# Patient Record
Sex: FEMALE | Race: WHITE | URBAN - METROPOLITAN AREA
[De-identification: names, ages, dates, MRNs, and addresses within clinical notes are randomized per-mention and may not be internally consistent; named-entity substitution may affect disease eponyms.]

---

## 2021-02-01 ENCOUNTER — EKG ENCOUNTER (OUTPATIENT)
Dept: LAB | Age: 31
End: 2021-02-01
Attending: STUDENT IN AN ORGANIZED HEALTH CARE EDUCATION/TRAINING PROGRAM
Payer: COMMERCIAL

## 2021-02-01 ENCOUNTER — OFFICE VISIT (OUTPATIENT)
Dept: FAMILY MEDICINE CLINIC | Facility: CLINIC | Age: 31
End: 2021-02-01
Payer: COMMERCIAL

## 2021-02-01 VITALS
SYSTOLIC BLOOD PRESSURE: 148 MMHG | WEIGHT: 293 LBS | HEIGHT: 68 IN | TEMPERATURE: 98 F | HEART RATE: 111 BPM | BODY MASS INDEX: 44.41 KG/M2 | DIASTOLIC BLOOD PRESSURE: 78 MMHG

## 2021-02-01 DIAGNOSIS — M21.41 FLAT FEET, BILATERAL: ICD-10-CM

## 2021-02-01 DIAGNOSIS — Z00.00 WELL ADULT EXAM: ICD-10-CM

## 2021-02-01 DIAGNOSIS — E66.01 OBESITY, MORBID, BMI 50 OR HIGHER (HCC): ICD-10-CM

## 2021-02-01 DIAGNOSIS — Z00.00 WELL ADULT EXAM: Primary | ICD-10-CM

## 2021-02-01 DIAGNOSIS — M21.42 FLAT FEET, BILATERAL: ICD-10-CM

## 2021-02-01 DIAGNOSIS — L81.9 HYPERPIGMENTATION OF SKIN: ICD-10-CM

## 2021-02-01 DIAGNOSIS — R07.9 LEFT-SIDED CHEST PAIN: Primary | ICD-10-CM

## 2021-02-01 DIAGNOSIS — R07.9 LEFT-SIDED CHEST PAIN: ICD-10-CM

## 2021-02-01 PROBLEM — U07.1 COVID-19: Status: ACTIVE | Noted: 2020-11-25

## 2021-02-01 LAB
ALBUMIN SERPL-MCNC: 3.8 G/DL (ref 3.4–5)
ALBUMIN/GLOB SERPL: 1.1 {RATIO} (ref 1–2)
ALP LIVER SERPL-CCNC: 66 U/L
ALT SERPL-CCNC: 32 U/L
ANION GAP SERPL CALC-SCNC: 7 MMOL/L (ref 0–18)
AST SERPL-CCNC: 12 U/L (ref 15–37)
BASOPHILS # BLD AUTO: 0.03 X10(3) UL (ref 0–0.2)
BASOPHILS NFR BLD AUTO: 0.5 %
BILIRUB SERPL-MCNC: 0.5 MG/DL (ref 0.1–2)
BUN BLD-MCNC: 12 MG/DL (ref 7–18)
BUN/CREAT SERPL: 18.2 (ref 10–20)
CALCIUM BLD-MCNC: 9.2 MG/DL (ref 8.5–10.1)
CHLORIDE SERPL-SCNC: 109 MMOL/L (ref 98–112)
CHOLEST SMN-MCNC: 122 MG/DL (ref ?–200)
CO2 SERPL-SCNC: 25 MMOL/L (ref 21–32)
CREAT BLD-MCNC: 0.66 MG/DL
DEPRECATED RDW RBC AUTO: 43.8 FL (ref 35.1–46.3)
EOSINOPHIL # BLD AUTO: 0.14 X10(3) UL (ref 0–0.7)
EOSINOPHIL NFR BLD AUTO: 2.1 %
ERYTHROCYTE [DISTWIDTH] IN BLOOD BY AUTOMATED COUNT: 13.2 % (ref 11–15)
EST. AVERAGE GLUCOSE BLD GHB EST-MCNC: 111 MG/DL (ref 68–126)
GLOBULIN PLAS-MCNC: 3.6 G/DL (ref 2.8–4.4)
GLUCOSE BLD-MCNC: 99 MG/DL (ref 70–99)
HBA1C MFR BLD HPLC: 5.5 % (ref ?–5.7)
HCT VFR BLD AUTO: 44.4 %
HDLC SERPL-MCNC: 46 MG/DL (ref 40–59)
HGB BLD-MCNC: 14.5 G/DL
IMM GRANULOCYTES # BLD AUTO: 0.01 X10(3) UL (ref 0–1)
IMM GRANULOCYTES NFR BLD: 0.2 %
LDLC SERPL CALC-MCNC: 53 MG/DL (ref ?–100)
LYMPHOCYTES # BLD AUTO: 2.25 X10(3) UL (ref 1–4)
LYMPHOCYTES NFR BLD AUTO: 34.5 %
M PROTEIN MFR SERPL ELPH: 7.4 G/DL (ref 6.4–8.2)
MCH RBC QN AUTO: 29.5 PG (ref 26–34)
MCHC RBC AUTO-ENTMCNC: 32.7 G/DL (ref 31–37)
MCV RBC AUTO: 90.2 FL
MONOCYTES # BLD AUTO: 0.42 X10(3) UL (ref 0.1–1)
MONOCYTES NFR BLD AUTO: 6.4 %
NEUTROPHILS # BLD AUTO: 3.67 X10 (3) UL (ref 1.5–7.7)
NEUTROPHILS # BLD AUTO: 3.67 X10(3) UL (ref 1.5–7.7)
NEUTROPHILS NFR BLD AUTO: 56.3 %
NONHDLC SERPL-MCNC: 76 MG/DL (ref ?–130)
OSMOLALITY SERPL CALC.SUM OF ELEC: 292 MOSM/KG (ref 275–295)
PATIENT FASTING Y/N/NP: NO
PATIENT FASTING Y/N/NP: NO
PLATELET # BLD AUTO: 329 10(3)UL (ref 150–450)
POTASSIUM SERPL-SCNC: 3.9 MMOL/L (ref 3.5–5.1)
RBC # BLD AUTO: 4.92 X10(6)UL
SODIUM SERPL-SCNC: 141 MMOL/L (ref 136–145)
TRIGL SERPL-MCNC: 115 MG/DL (ref 30–149)
TSI SER-ACNC: 0.91 MIU/ML (ref 0.36–3.74)
VLDLC SERPL CALC-MCNC: 23 MG/DL (ref 0–30)
WBC # BLD AUTO: 6.5 X10(3) UL (ref 4–11)

## 2021-02-01 PROCEDURE — 3078F DIAST BP <80 MM HG: CPT | Performed by: STUDENT IN AN ORGANIZED HEALTH CARE EDUCATION/TRAINING PROGRAM

## 2021-02-01 PROCEDURE — 84443 ASSAY THYROID STIM HORMONE: CPT

## 2021-02-01 PROCEDURE — 85025 COMPLETE CBC W/AUTO DIFF WBC: CPT

## 2021-02-01 PROCEDURE — 80053 COMPREHEN METABOLIC PANEL: CPT

## 2021-02-01 PROCEDURE — 3077F SYST BP >= 140 MM HG: CPT | Performed by: STUDENT IN AN ORGANIZED HEALTH CARE EDUCATION/TRAINING PROGRAM

## 2021-02-01 PROCEDURE — 3008F BODY MASS INDEX DOCD: CPT | Performed by: STUDENT IN AN ORGANIZED HEALTH CARE EDUCATION/TRAINING PROGRAM

## 2021-02-01 PROCEDURE — 82306 VITAMIN D 25 HYDROXY: CPT

## 2021-02-01 PROCEDURE — 36415 COLL VENOUS BLD VENIPUNCTURE: CPT

## 2021-02-01 PROCEDURE — 99385 PREV VISIT NEW AGE 18-39: CPT | Performed by: STUDENT IN AN ORGANIZED HEALTH CARE EDUCATION/TRAINING PROGRAM

## 2021-02-01 PROCEDURE — 80061 LIPID PANEL: CPT

## 2021-02-01 PROCEDURE — 93010 ELECTROCARDIOGRAM REPORT: CPT | Performed by: STUDENT IN AN ORGANIZED HEALTH CARE EDUCATION/TRAINING PROGRAM

## 2021-02-01 PROCEDURE — 83036 HEMOGLOBIN GLYCOSYLATED A1C: CPT

## 2021-02-01 PROCEDURE — 93005 ELECTROCARDIOGRAM TRACING: CPT

## 2021-02-01 NOTE — PROGRESS NOTES
HPI:    Patient ID: Robin Yo is a 31year old female. HPI  Pt presenting for routine physical exam. Denies any acute issues or recent illnesses. No significant chronic medical problems.  Past medical/surgical history, family history, and social hi Allergies    02/01/21  1046   BP: 148/78   Pulse: 111   Temp: 98 °F (36.7 °C)   TempSrc: Temporal   Weight: (!) 343 lb (155.6 kg)   Height: 5' 8\" (1.727 m)       Body mass index is 52.15 kg/m². PHYSICAL EXAM:   Physical Exam  Vitals signs reviewed.    Co person, place, and time. Mental status is at baseline. Psychiatric:         Attention and Perception: Attention normal.         Mood and Affect: Mood normal. Affect is tearful. Behavior: Behavior normal. Behavior is cooperative.          Thought C

## 2021-02-03 LAB — 25(OH)D3 SERPL-MCNC: 16.7 NG/ML (ref 30–100)

## 2021-02-04 PROBLEM — M21.41 FLAT FEET, BILATERAL: Status: ACTIVE | Noted: 2021-02-04

## 2021-02-04 PROBLEM — M21.42 FLAT FEET, BILATERAL: Status: ACTIVE | Noted: 2021-02-04

## 2021-02-04 PROBLEM — E66.01 OBESITY, MORBID, BMI 50 OR HIGHER (HCC): Status: ACTIVE | Noted: 2021-02-04

## 2021-04-23 DIAGNOSIS — E55.9 VITAMIN D DEFICIENCY: ICD-10-CM

## 2021-04-23 RX ORDER — ERGOCALCIFEROL 1.25 MG/1
CAPSULE ORAL
Qty: 12 CAPSULE | Refills: 1 | Status: SHIPPED | OUTPATIENT
Start: 2021-04-23

## 2021-08-02 ENCOUNTER — LAB ENCOUNTER (OUTPATIENT)
Dept: LAB | Age: 31
End: 2021-08-02
Attending: STUDENT IN AN ORGANIZED HEALTH CARE EDUCATION/TRAINING PROGRAM
Payer: COMMERCIAL

## 2021-08-02 ENCOUNTER — OFFICE VISIT (OUTPATIENT)
Dept: FAMILY MEDICINE CLINIC | Facility: CLINIC | Age: 31
End: 2021-08-02
Payer: COMMERCIAL

## 2021-08-02 VITALS
WEIGHT: 293 LBS | DIASTOLIC BLOOD PRESSURE: 79 MMHG | RESPIRATION RATE: 18 BRPM | HEART RATE: 68 BPM | HEIGHT: 68 IN | BODY MASS INDEX: 44.41 KG/M2 | SYSTOLIC BLOOD PRESSURE: 127 MMHG

## 2021-08-02 DIAGNOSIS — L91.8 MULTIPLE ACQUIRED SKIN TAGS: ICD-10-CM

## 2021-08-02 DIAGNOSIS — E55.9 VITAMIN D DEFICIENCY: ICD-10-CM

## 2021-08-02 DIAGNOSIS — L30.1 DYSHIDROTIC ECZEMA: Primary | ICD-10-CM

## 2021-08-02 PROCEDURE — 36415 COLL VENOUS BLD VENIPUNCTURE: CPT

## 2021-08-02 PROCEDURE — 82306 VITAMIN D 25 HYDROXY: CPT

## 2021-08-02 PROCEDURE — 3074F SYST BP LT 130 MM HG: CPT | Performed by: STUDENT IN AN ORGANIZED HEALTH CARE EDUCATION/TRAINING PROGRAM

## 2021-08-02 PROCEDURE — 3078F DIAST BP <80 MM HG: CPT | Performed by: STUDENT IN AN ORGANIZED HEALTH CARE EDUCATION/TRAINING PROGRAM

## 2021-08-02 PROCEDURE — 99213 OFFICE O/P EST LOW 20 MIN: CPT | Performed by: STUDENT IN AN ORGANIZED HEALTH CARE EDUCATION/TRAINING PROGRAM

## 2021-08-02 PROCEDURE — 3008F BODY MASS INDEX DOCD: CPT | Performed by: STUDENT IN AN ORGANIZED HEALTH CARE EDUCATION/TRAINING PROGRAM

## 2021-08-02 RX ORDER — TRIAMCINOLONE ACETONIDE 5 MG/G
1 CREAM TOPICAL 2 TIMES DAILY PRN
Qty: 15 G | Refills: 1 | Status: SHIPPED | OUTPATIENT
Start: 2021-08-02

## 2021-08-02 NOTE — PROGRESS NOTES
HPI:    Patient ID: Latrice Cho is a 36year old female. HPI  Pt presenting with skin lesions. H/o dry skin in the winter. She reports new blisters between fingers this summer that become dry, itchy, and painful.  She works with her hands as a hairst ASSESSMENT/PLAN:   1.  Dyshidrotic eczema  - encouraged daily emollient application  - apply topical steroid to affected areas  - increase hydration and rest as tolerated  - avoid skin irritants as able  - triamcinolone acetonide 0.5 % External Cream

## 2021-08-03 LAB — VIT D+METAB SERPL-MCNC: 23.4 NG/ML (ref 30–100)

## 2021-08-30 ENCOUNTER — OFFICE VISIT (OUTPATIENT)
Dept: FAMILY MEDICINE CLINIC | Facility: CLINIC | Age: 31
End: 2021-08-30
Payer: COMMERCIAL

## 2021-08-30 VITALS
HEART RATE: 76 BPM | SYSTOLIC BLOOD PRESSURE: 116 MMHG | HEIGHT: 68 IN | BODY MASS INDEX: 44.41 KG/M2 | WEIGHT: 293 LBS | DIASTOLIC BLOOD PRESSURE: 75 MMHG | TEMPERATURE: 98 F

## 2021-08-30 DIAGNOSIS — L91.8 INFLAMED SKIN TAG: ICD-10-CM

## 2021-08-30 DIAGNOSIS — L30.1 DYSHIDROTIC ECZEMA: Primary | ICD-10-CM

## 2021-08-30 PROCEDURE — 3078F DIAST BP <80 MM HG: CPT | Performed by: STUDENT IN AN ORGANIZED HEALTH CARE EDUCATION/TRAINING PROGRAM

## 2021-08-30 PROCEDURE — 99213 OFFICE O/P EST LOW 20 MIN: CPT | Performed by: STUDENT IN AN ORGANIZED HEALTH CARE EDUCATION/TRAINING PROGRAM

## 2021-08-30 PROCEDURE — 3074F SYST BP LT 130 MM HG: CPT | Performed by: STUDENT IN AN ORGANIZED HEALTH CARE EDUCATION/TRAINING PROGRAM

## 2021-08-30 PROCEDURE — 3008F BODY MASS INDEX DOCD: CPT | Performed by: STUDENT IN AN ORGANIZED HEALTH CARE EDUCATION/TRAINING PROGRAM

## 2021-08-30 PROCEDURE — 11200 RMVL SKIN TAGS UP TO&INC 15: CPT | Performed by: STUDENT IN AN ORGANIZED HEALTH CARE EDUCATION/TRAINING PROGRAM

## 2021-08-30 RX ORDER — FLUOCINONIDE 0.5 MG/G
1 OINTMENT TOPICAL 2 TIMES DAILY
Qty: 30 G | Refills: 0 | Status: SHIPPED | OUTPATIENT
Start: 2021-08-30

## 2021-08-30 NOTE — PROGRESS NOTES
HPI:    Patient ID: Vasu Goldman is a 36year old female. HPI  Pt presenting for follow-up.  She started triamcinolone cream for hand eczema, notes initially improvement however hands became extremely dry and became cracked, reports pain with finger f ASSESSMENT/PLAN:   1. Dyshidrotic eczema  - emphasized regular emollient application  - will trial fluocinonide ointment at bedtime  - follow-up with Derm for persistent lesions  - Fluocinonide 0.05 % External Ointment;  Apply 1 Application topically 2 (t

## 2021-10-25 ENCOUNTER — OFFICE VISIT (OUTPATIENT)
Dept: DERMATOLOGY CLINIC | Facility: CLINIC | Age: 31
End: 2021-10-25
Payer: COMMERCIAL

## 2021-10-25 DIAGNOSIS — L30.9 DERMATITIS: Primary | ICD-10-CM

## 2021-10-25 PROCEDURE — 99203 OFFICE O/P NEW LOW 30 MIN: CPT | Performed by: DERMATOLOGY

## 2021-10-25 RX ORDER — PREDNISONE 10 MG/1
TABLET ORAL
Qty: 120 TABLET | Refills: 0 | Status: SHIPPED | OUTPATIENT
Start: 2021-10-25

## 2021-10-25 RX ORDER — CEPHALEXIN 500 MG/1
500 CAPSULE ORAL 2 TIMES DAILY
Qty: 20 CAPSULE | Refills: 0 | Status: SHIPPED | OUTPATIENT
Start: 2021-10-25

## 2021-10-25 RX ORDER — FLUCONAZOLE 100 MG/1
100 TABLET ORAL DAILY
Qty: 7 TABLET | Refills: 1 | Status: SHIPPED | OUTPATIENT
Start: 2021-10-25

## 2021-11-02 ENCOUNTER — TELEPHONE (OUTPATIENT)
Dept: DERMATOLOGY CLINIC | Facility: CLINIC | Age: 31
End: 2021-11-02

## 2021-11-02 NOTE — TELEPHONE ENCOUNTER
She should not need additional fluconazole. May decrease prednisone to 20 in am and 10 in pm ( 30mg every day)  x 5 d then decrease to 20mg every day x 5 d, then 10mg every day x 5  finish cephalexin. Continue moisturizers. Peeling to be expected.  I would

## 2021-11-02 NOTE — TELEPHONE ENCOUNTER
Patient calling provider told her to call in 5-7 days after taking medication. Patient would like to know the next steps. Please advise.

## 2021-11-02 NOTE — TELEPHONE ENCOUNTER
S/w pt. LOV 10/25/2, pt calling with an update. She continues on prednisone 20mg BID, keflex 500mg BID, has 1 pill left of fluconazole for today (should she refill?) using cerave cream during the day to hands and aquaphor at bedtime.  States hands have meagan

## 2021-11-07 NOTE — PROGRESS NOTES
Reynaldo Palomares is a 36year old female. Patient presents with:  Derm Problem: New pt. pt presenting today with rash to bilateral hands and fingers since July. c/o dryness and blisters to bilateral fingers. pt states also itching.  Denies changes in sop (two) times daily as needed (itching).  (Patient not taking: Reported on 10/25/2021) 15 g 1   • ERGOCALCIFEROL 1.25 MG (21656 UT) Oral Cap TAKE 1 CAPSULE BY MOUTH ONE TIME PER WEEK (Patient not taking: Reported on 10/25/2021) 12 capsule 1     Allergies:   N Faith Services: Not on file      Active Member of Clubs or Organizations: Not on file      Attends Club or Organization Meetings: Not on file      Marital Status: Not on file  Intimate Partner Violence:       Fear of Current or Ex-Partner: Not on file with concerns above. Denies any other systemic complaints. No fevers, chills, night sweats, photosensitivity, lymph node swelling. No other skin complaints. History, medications, allergies as noted. Nothing new or different no unusual exposures. fissuring cephalexin, oral fluconazole for more fissuring and scaling at the webspaces  Dermatitis. Meds in grid. Skin care instructions reviewed.   The use various products including moisturizers, creams soaps cleansers detergent etc. reviewed with victoria

## 2021-11-15 ENCOUNTER — TELEPHONE (OUTPATIENT)
Dept: DERMATOLOGY CLINIC | Facility: CLINIC | Age: 31
End: 2021-11-15

## 2021-11-15 ENCOUNTER — OFFICE VISIT (OUTPATIENT)
Dept: DERMATOLOGY CLINIC | Facility: CLINIC | Age: 31
End: 2021-11-15
Payer: COMMERCIAL

## 2021-11-15 DIAGNOSIS — L30.9 DERMATITIS: ICD-10-CM

## 2021-11-15 DIAGNOSIS — L20.89 OTHER ATOPIC DERMATITIS: Primary | ICD-10-CM

## 2021-11-15 PROCEDURE — 99213 OFFICE O/P EST LOW 20 MIN: CPT | Performed by: DERMATOLOGY

## 2021-11-15 RX ORDER — DUPILUMAB 300 MG/2ML
600 INJECTION, SOLUTION SUBCUTANEOUS ONCE
Qty: 4 ML | Refills: 0 | Status: SHIPPED | OUTPATIENT
Start: 2021-11-16 | End: 2021-11-16

## 2021-11-15 RX ORDER — BETAMETHASONE DIPROPIONATE 0.5 MG/G
1 CREAM TOPICAL 2 TIMES DAILY
Qty: 50 G | Refills: 3 | Status: SHIPPED | OUTPATIENT
Start: 2021-11-15

## 2021-11-15 RX ORDER — PREDNISONE 10 MG/1
10 TABLET ORAL 2 TIMES DAILY
Qty: 60 TABLET | Refills: 0 | Status: SHIPPED | OUTPATIENT
Start: 2021-11-15 | End: 2022-01-19

## 2021-11-15 RX ORDER — TACROLIMUS 1 MG/G
OINTMENT TOPICAL
Qty: 100 G | Refills: 12 | Status: SHIPPED | OUTPATIENT
Start: 2021-11-15

## 2021-11-15 RX ORDER — DUPILUMAB 300 MG/2ML
300 INJECTION, SOLUTION SUBCUTANEOUS
Qty: 4 ML | Refills: 5 | Status: SHIPPED | OUTPATIENT
Start: 2021-11-15 | End: 2021-12-13

## 2021-11-15 NOTE — TELEPHONE ENCOUNTER
Dupixent patient information, completed. Insurance attached. Given to [facility], await decision.

## 2021-11-16 NOTE — TELEPHONE ENCOUNTER
Dupixent enrollment forms faxed to both [employer] and [employer] on 11/16/21 and placed in the [employer] PA binder. Awaiting determination.

## 2021-11-18 ENCOUNTER — TELEPHONE (OUTPATIENT)
Dept: DERMATOLOGY CLINIC | Facility: CLINIC | Age: 31
End: 2021-11-18

## 2021-11-18 RX ORDER — PREDNISONE 10 MG/1
TABLET ORAL
Qty: 120 TABLET | Refills: 0 | OUTPATIENT
Start: 2021-11-18

## 2021-11-18 NOTE — TELEPHONE ENCOUNTER
Patient called    Asking to speak to RN about medications. Unclear about what she should be doing. No notes in MyChart yet.  Please call

## 2021-11-19 NOTE — TELEPHONE ENCOUNTER
Fax from [address] [place] [address],[facility] information for Dupixent    Chart notes and failed treatments.      Placed fax in pa inbox

## 2021-11-20 NOTE — TELEPHONE ENCOUNTER
Please let pt know both of these needs PA's and the Dupixent may be a long process to be approved and may require alternative meds prior

## 2021-11-28 NOTE — PROGRESS NOTES
Discussed lid hygiene, warm compress and eyelid wash. Goyo Bassett is a 36year old female. Patient presents with:  Derm Problem: LOV 10/2/21. pt presenting today with Dermatitis f/u to bilateral hands and fingers. pt states some improvement.  c/o blisters to fingers since tapering down off Prednisone Vaping Use: Never used    Alcohol use: Not Currently    Drug use: Not Currently      Types: Cannabis                Current Outpatient Medications   Medication Sig Dispense Refill   • predniSONE 10 MG Oral Tab Take 1 tablet (10 mg total) by mouth 2 (two) t status: Never Smoker      Smokeless tobacco: Never Used    Vaping Use      Vaping Use: Never used    Substance and Sexual Activity      Alcohol use: Not Currently      Drug use: Not Currently        Types: Cannabis      Sexual activity: Not on file    Othe systemic complaints. No fevers, chills, night sweats, photosensitivity, lymph node swelling. No other skin complaints. History, medications, allergies as noted. Nothing new or different no unusual exposures.   No changes in soaps, detergents, skin c of secondary infection  Reviewed possibility of Dupixent. Patient like to proceed. Eruption has been going on for months worsening recently. Now prednisone dependent    Dermatitis. Meds in grid. Skin care instructions reviewed.   The use various produc (from the past 48 hour(s)). Meds This Visit:      Imaging Orders:  None     Referral Orders:  No orders of the defined types were placed in this encounter.

## 2021-12-01 NOTE — TELEPHONE ENCOUNTER
Fax from [employer] for [facility] pen-INj    Kat Raymond Duenas 3 fax in pa inbox

## 2021-12-02 NOTE — TELEPHONE ENCOUNTER
Eric Elam  [phone]  Ref# [id number]    She wants to know if we're going to appeal the denial. Please advise

## 2021-12-10 ENCOUNTER — TELEPHONE (OUTPATIENT)
Dept: DERMATOLOGY CLINIC | Facility: CLINIC | Age: 31
End: 2021-12-10

## 2021-12-10 ENCOUNTER — PATIENT MESSAGE (OUTPATIENT)
Dept: DERMATOLOGY CLINIC | Facility: CLINIC | Age: 31
End: 2021-12-10

## 2021-12-10 RX ORDER — MOMETASONE FUROATE 1 MG/G
1 CREAM TOPICAL DAILY
Qty: 50 G | Refills: 1 | Status: SHIPPED | OUTPATIENT
Start: 2021-12-10

## 2021-12-10 NOTE — TELEPHONE ENCOUNTER
Patient called    Returning call from RN. Asked for a direct number to RN, advised no number is available to provide her. States \"she just can't take a personal medical call in front of clients\".  Advised patient RN will call her back, if she can't answer

## 2021-12-10 NOTE — TELEPHONE ENCOUNTER
Per patient she needs to talk to RN, two days she has been breaking out in hives  .  Patient is worried that this will happen again tonight and is worried that this is connected to her meds

## 2021-12-10 NOTE — TELEPHONE ENCOUNTER
From: Jt Han  To: Anahi Brice MD  Sent: 12/10/2021 3:05 PM CST  Subject: Skin concerns. Tried calling office but unable to answer the nurse so she suggested my chart.      Hello I have been playing phone tag and I apologize I am with clients al

## 2021-12-10 NOTE — TELEPHONE ENCOUNTER
S/w pt. LOV 11/15/21. Pt was RXed: dupixent, prednisone, betamethasone, tacrolimus. Dupixent in process.   Pt takes prednisone 10mg BID  Betamethasone was out of stock and pt did not follow up with pharmacy to see if they got it back but will call them b

## 2021-12-10 NOTE — TELEPHONE ENCOUNTER
Please refer to Elizabeth Beth, s/w pt Tissue Cultured Epidermal Autograft Text: The defect edges were debeveled with a #15 scalpel blade.  Given the location of the defect, shape of the defect and the proximity to free margins a tissue cultured epidermal autograft was deemed most appropriate.  The graft was then trimmed to fit the size of the defect.  The graft was then placed in the primary defect and oriented appropriately.

## 2021-12-11 ENCOUNTER — TELEPHONE (OUTPATIENT)
Dept: DERMATOLOGY CLINIC | Facility: CLINIC | Age: 31
End: 2021-12-11

## 2021-12-11 NOTE — TELEPHONE ENCOUNTER
These new areas look more like a contact dermatitis, not really hives. She may have come into contact with something earlier in the week or day that triggered it. This does not look like an allergy to the steroids. Patch testing?       Message to pt:  H

## 2021-12-14 NOTE — TELEPHONE ENCOUNTER
Medication PA Requested:             tacrolimus 0.1% oint                                             CoverMyMeds Used:   Key:  Quantity: 100gm  Day Supply:30  Sig: apply BID  DX Code:        L30.9                             CPT code (if applicable):   Case Number/Pending Ref#:    Thank you

## 2021-12-14 NOTE — TELEPHONE ENCOUNTER
Yes please proceed, pt has been on po steroids chronically, multiple topical steroids, for atopic dermatitis

## 2021-12-15 NOTE — TELEPHONE ENCOUNTER
Medication PA Requested:             tacrolimus 0.1% oint                                             CoverMyMeds Used:   Key:  Quantity: 100gm  Day Supply:30  Sig: apply BID  DX Code:        L30.9        Faxed last 2 office notes and [employer] PA form.

## 2021-12-16 NOTE — TELEPHONE ENCOUNTER
Use with the mometasone. May use this on the face and other areas of eczema on the hands--may use in the evening since this is an ointment. It does not have any steroid in this. .  It can make itching and irritation worse at first,so use over steroid.   Safe anywhere on the skin

## 2021-12-16 NOTE — TELEPHONE ENCOUNTER
Pt informed of PA approval for Protopic. Pt asking if she is supposed to use this in place of the mometasone or use both? Please advise. Thank you.

## 2021-12-16 NOTE — TELEPHONE ENCOUNTER
Fax from [facility]    Approved for Tacrolimus 0.1% ointment    From 12/16/21-12/16/22    Placed fax in pa inbox

## 2021-12-17 NOTE — TELEPHONE ENCOUNTER
Pt informed, voiced understanding. She also adds that she was able to get betamethasone finally so now she has mometasone and betamethasone at home - she would like to confirm which one you'd like her to use.

## 2021-12-20 NOTE — TELEPHONE ENCOUNTER
Pt informed. Dr. Gretchen Hassan - pt would like to discuss tapering off of the steroids if possible. Pt states she has been taking steroids now for about 9 weeks and is starting to feel the effects (pt states anxiety is high, face looks like it is changing). Please advise. Thank you.

## 2021-12-21 NOTE — TELEPHONE ENCOUNTER
Spoke with pt and she confirmed she is taking 10mg twice daily. Pt provided with tapering instructions and advised to provide a condition update in 1 week. Pt states she will try cutting the 10mg tablet in half but will call if she wants the 5mg rx sent. Pt states she did not realize she missed calls from [place]. Pt provided with contact information for [employer] and will follow up with them right away.

## 2021-12-21 NOTE — TELEPHONE ENCOUNTER
Any update on the [address]? Is been a month since we sent all the information. I do not see any new information Manav Brooks in recent messages. Please confirm current dosage of prednisone is still 10 mg twice daily. She could try alternating days of 20 and 15 mg.   She may cut the tablets in half or we can send 5 mg tablets whichever is easier for her okay send my chart message to her

## 2021-12-21 NOTE — TELEPHONE ENCOUNTER
LMTCB for pt to confirm prednisone dose. Please see KMT's msg.  I reached out to [facility] also - they want to start the appeal but needs pt's HIPPA form signed - they said they left pt 3 msgs and have not heard back from her.

## 2021-12-28 NOTE — TELEPHONE ENCOUNTER
Fax from [employer] submitted to plan for [address] 300mg/2ml pen    Placed fax in pa inbox

## 2021-12-29 ENCOUNTER — PATIENT MESSAGE (OUTPATIENT)
Dept: DERMATOLOGY CLINIC | Facility: CLINIC | Age: 31
End: 2021-12-29

## 2021-12-29 NOTE — TELEPHONE ENCOUNTER
Fax reviewed - Jill Marroquin has forwarded appeal for dupixent to pt's [address].    Awaiting further input from Jill Marroquin

## 2021-12-29 NOTE — TELEPHONE ENCOUNTER
Dr. Andrey Sandhoff can Rebecca Hunt taper down her prednisone more? LOV 11/15/21, please see her msg, below dosage.

## 2022-01-17 ENCOUNTER — PATIENT MESSAGE (OUTPATIENT)
Dept: DERMATOLOGY CLINIC | Facility: CLINIC | Age: 32
End: 2022-01-17

## 2022-01-18 NOTE — TELEPHONE ENCOUNTER
Message sent with taper    After the next few days of alternating days of  the 10 and 15 mg dose of prednisone, I would then suggest staying on 10mg daily for 1 week,  And if ok then decrease to alternating days of 5mg and 10mg for 10 days then decrease to

## 2022-01-20 RX ORDER — PREDNISONE 10 MG/1
10 TABLET ORAL 2 TIMES DAILY
Qty: 60 TABLET | Refills: 0 | Status: SHIPPED | OUTPATIENT
Start: 2022-01-20

## 2022-01-31 ENCOUNTER — OFFICE VISIT (OUTPATIENT)
Dept: FAMILY MEDICINE CLINIC | Facility: CLINIC | Age: 32
End: 2022-01-31
Payer: COMMERCIAL

## 2022-01-31 ENCOUNTER — LAB ENCOUNTER (OUTPATIENT)
Dept: LAB | Age: 32
End: 2022-01-31
Attending: STUDENT IN AN ORGANIZED HEALTH CARE EDUCATION/TRAINING PROGRAM
Payer: COMMERCIAL

## 2022-01-31 VITALS
DIASTOLIC BLOOD PRESSURE: 84 MMHG | RESPIRATION RATE: 18 BRPM | WEIGHT: 293 LBS | SYSTOLIC BLOOD PRESSURE: 146 MMHG | HEIGHT: 68 IN | HEART RATE: 86 BPM | BODY MASS INDEX: 44.41 KG/M2

## 2022-01-31 DIAGNOSIS — Z12.4 SCREENING FOR CERVICAL CANCER: ICD-10-CM

## 2022-01-31 DIAGNOSIS — L29.8 PRURITIC ERYTHEMATOUS RASH: ICD-10-CM

## 2022-01-31 DIAGNOSIS — E66.01 OBESITY, MORBID, BMI 50 OR HIGHER (HCC): ICD-10-CM

## 2022-01-31 DIAGNOSIS — Z01.419 WELL WOMAN EXAM: ICD-10-CM

## 2022-01-31 DIAGNOSIS — Z01.419 WELL WOMAN EXAM: Primary | ICD-10-CM

## 2022-01-31 LAB
ALBUMIN SERPL-MCNC: 3.6 G/DL (ref 3.4–5)
ALBUMIN/GLOB SERPL: 1.2 {RATIO} (ref 1–2)
ALP LIVER SERPL-CCNC: 60 U/L
ALT SERPL-CCNC: 28 U/L
ANION GAP SERPL CALC-SCNC: 7 MMOL/L (ref 0–18)
AST SERPL-CCNC: 9 U/L (ref 15–37)
BILIRUB SERPL-MCNC: 0.5 MG/DL (ref 0.1–2)
BILIRUB UR QL: NEGATIVE
BUN BLD-MCNC: 11 MG/DL (ref 7–18)
BUN/CREAT SERPL: 17.7 (ref 10–20)
CALCIUM BLD-MCNC: 9 MG/DL (ref 8.5–10.1)
CHLORIDE SERPL-SCNC: 107 MMOL/L (ref 98–112)
CHOLEST SERPL-MCNC: 150 MG/DL (ref ?–200)
CO2 SERPL-SCNC: 26 MMOL/L (ref 21–32)
COLOR UR: YELLOW
CREAT BLD-MCNC: 0.62 MG/DL
DEPRECATED RDW RBC AUTO: 44.6 FL (ref 35.1–46.3)
ERYTHROCYTE [DISTWIDTH] IN BLOOD BY AUTOMATED COUNT: 13.1 % (ref 11–15)
EST. AVERAGE GLUCOSE BLD GHB EST-MCNC: 114 MG/DL (ref 68–126)
FASTING PATIENT LIPID ANSWER: YES
GLOBULIN PLAS-MCNC: 3 G/DL (ref 2.8–4.4)
GLUCOSE BLD-MCNC: 80 MG/DL (ref 70–99)
HBA1C MFR BLD: 5.6 % (ref ?–5.7)
HCT VFR BLD AUTO: 45.2 %
HDLC SERPL-MCNC: 54 MG/DL (ref 40–59)
HGB BLD-MCNC: 14.5 G/DL
HGB UR QL STRIP.AUTO: NEGATIVE
LDLC SERPL CALC-MCNC: 71 MG/DL (ref ?–100)
MCH RBC QN AUTO: 29.8 PG (ref 26–34)
MCHC RBC AUTO-ENTMCNC: 32.1 G/DL (ref 31–37)
MCV RBC AUTO: 93 FL
NITRITE UR QL STRIP.AUTO: NEGATIVE
NONHDLC SERPL-MCNC: 96 MG/DL (ref ?–130)
OSMOLALITY SERPL CALC.SUM OF ELEC: 288 MOSM/KG (ref 275–295)
PH UR: 5 [PH] (ref 5–8)
PLATELET # BLD AUTO: 300 10(3)UL (ref 150–450)
POTASSIUM SERPL-SCNC: 3.8 MMOL/L (ref 3.5–5.1)
PROT SERPL-MCNC: 6.6 G/DL (ref 6.4–8.2)
PROT UR-MCNC: NEGATIVE MG/DL
RBC # BLD AUTO: 4.86 X10(6)UL
SODIUM SERPL-SCNC: 140 MMOL/L (ref 136–145)
SP GR UR STRIP: 1.02 (ref 1–1.03)
TRIGL SERPL-MCNC: 145 MG/DL (ref 30–149)
TSI SER-ACNC: 2.16 MIU/ML (ref 0.36–3.74)
UROBILINOGEN UR STRIP-ACNC: <2
VIT D+METAB SERPL-MCNC: 18.2 NG/ML (ref 30–100)
VLDLC SERPL CALC-MCNC: 22 MG/DL (ref 0–30)
WBC # BLD AUTO: 7.8 X10(3) UL (ref 4–11)

## 2022-01-31 PROCEDURE — 83036 HEMOGLOBIN GLYCOSYLATED A1C: CPT

## 2022-01-31 PROCEDURE — 85027 COMPLETE CBC AUTOMATED: CPT

## 2022-01-31 PROCEDURE — 3008F BODY MASS INDEX DOCD: CPT | Performed by: STUDENT IN AN ORGANIZED HEALTH CARE EDUCATION/TRAINING PROGRAM

## 2022-01-31 PROCEDURE — 36415 COLL VENOUS BLD VENIPUNCTURE: CPT

## 2022-01-31 PROCEDURE — 99395 PREV VISIT EST AGE 18-39: CPT | Performed by: STUDENT IN AN ORGANIZED HEALTH CARE EDUCATION/TRAINING PROGRAM

## 2022-01-31 PROCEDURE — 80053 COMPREHEN METABOLIC PANEL: CPT

## 2022-01-31 PROCEDURE — 82306 VITAMIN D 25 HYDROXY: CPT

## 2022-01-31 PROCEDURE — 84443 ASSAY THYROID STIM HORMONE: CPT

## 2022-01-31 PROCEDURE — 3079F DIAST BP 80-89 MM HG: CPT | Performed by: STUDENT IN AN ORGANIZED HEALTH CARE EDUCATION/TRAINING PROGRAM

## 2022-01-31 PROCEDURE — 81001 URINALYSIS AUTO W/SCOPE: CPT

## 2022-01-31 PROCEDURE — 80061 LIPID PANEL: CPT

## 2022-01-31 PROCEDURE — 3077F SYST BP >= 140 MM HG: CPT | Performed by: STUDENT IN AN ORGANIZED HEALTH CARE EDUCATION/TRAINING PROGRAM

## 2022-01-31 NOTE — PROGRESS NOTES
HPI:    Patient ID: Goyo Guevara is a 36year old female. HPI  Pt presenting for routine well woman exam. No significant chronic medical problems. Past medical/surgical history, family history, and social history were reviewed.  No h/o abnormal Pap sm times daily. 30 g 0   • triamcinolone acetonide 0.5 % External Cream Apply 1 Application topically 2 (two) times daily as needed (itching).  15 g 1   • ERGOCALCIFEROL 1.25 MG (82028 UT) Oral Cap TAKE 1 CAPSULE BY MOUTH ONE TIME PER WEEK 12 capsule 1   • cep Redness on Left labia  Musculoskeletal:      Cervical back: Normal range of motion and neck supple. No muscular tenderness. Right lower leg: No edema. Left lower leg: No edema. Lymphadenopathy:      Cervical: No cervical adenopathy.    Skin: plan.    Orders Placed This Encounter      TSH W Reflex To Free T4      CBC, Platelet;  No Differential      Comp Metabolic Panel (14)      Hemoglobin A1C      Lipid Panel      Vitamin D      Urinalysis, Routine      ThinPrep PAP with HPV Reflex Request [E]

## 2022-02-03 ENCOUNTER — TELEPHONE (OUTPATIENT)
Dept: FAMILY MEDICINE CLINIC | Facility: CLINIC | Age: 32
End: 2022-02-03

## 2022-02-03 NOTE — TELEPHONE ENCOUNTER
Spoke with Adrienne Feliz at [facility] and she states she will request an appeal denial letter to be faxed to our office. Staff - once we have the appeal denial letter, please fax to Adrienne Feliz at [facility] so that they can refer pt to [address] [place] [address] for financial assistance. Await fax from [employer].

## 2022-02-03 NOTE — TELEPHONE ENCOUNTER
Received fax from [facility] vitamin D was not covered, contacted pt she wanted to use [facility] .   LVM to [facility] pharmacy with the instructions of dispensing vitamin D.

## 2022-02-08 ENCOUNTER — MED REC SCAN ONLY (OUTPATIENT)
Dept: DERMATOLOGY CLINIC | Facility: CLINIC | Age: 32
End: 2022-02-08

## 2022-02-10 ENCOUNTER — MED REC SCAN ONLY (OUTPATIENT)
Dept: DERMATOLOGY CLINIC | Facility: CLINIC | Age: 32
End: 2022-02-10

## 2022-02-10 NOTE — TELEPHONE ENCOUNTER
Appeal denial letter received and faxed to [facility] on 2/10/22. Pt's enrollment forms, appeal, and appeal denial letter sent to scan.

## 2022-02-21 ENCOUNTER — OFFICE VISIT (OUTPATIENT)
Dept: ALLERGY | Facility: CLINIC | Age: 32
End: 2022-02-21
Payer: COMMERCIAL

## 2022-02-21 ENCOUNTER — NURSE ONLY (OUTPATIENT)
Dept: ALLERGY | Facility: CLINIC | Age: 32
End: 2022-02-21
Payer: COMMERCIAL

## 2022-02-21 VITALS — HEART RATE: 84 BPM | DIASTOLIC BLOOD PRESSURE: 81 MMHG | OXYGEN SATURATION: 97 % | SYSTOLIC BLOOD PRESSURE: 132 MMHG

## 2022-02-21 DIAGNOSIS — L30.9 DERMATITIS: Primary | ICD-10-CM

## 2022-02-21 DIAGNOSIS — L50.9 URTICARIA: ICD-10-CM

## 2022-02-21 DIAGNOSIS — J30.1 SEASONAL ALLERGIC RHINITIS DUE TO POLLEN: ICD-10-CM

## 2022-02-21 DIAGNOSIS — Z23 FLU VACCINE NEED: ICD-10-CM

## 2022-02-21 DIAGNOSIS — T78.40XA ALLERGY, INITIAL ENCOUNTER: ICD-10-CM

## 2022-02-21 DIAGNOSIS — Z92.29 COVID-19 VACCINE SERIES COMPLETED: ICD-10-CM

## 2022-02-21 PROCEDURE — 95024 IQ TESTS W/ALLERGENIC XTRCS: CPT | Performed by: ALLERGY & IMMUNOLOGY

## 2022-02-21 PROCEDURE — 3079F DIAST BP 80-89 MM HG: CPT | Performed by: ALLERGY & IMMUNOLOGY

## 2022-02-21 PROCEDURE — 3075F SYST BP GE 130 - 139MM HG: CPT | Performed by: ALLERGY & IMMUNOLOGY

## 2022-02-21 PROCEDURE — 95004 PERQ TESTS W/ALRGNC XTRCS: CPT | Performed by: ALLERGY & IMMUNOLOGY

## 2022-02-21 PROCEDURE — 99244 OFF/OP CNSLTJ NEW/EST MOD 40: CPT | Performed by: ALLERGY & IMMUNOLOGY

## 2022-02-21 RX ORDER — LEVOCETIRIZINE DIHYDROCHLORIDE 5 MG/1
5 TABLET, FILM COATED ORAL EVERY 12 HOURS PRN
Qty: 180 TABLET | Refills: 0 | Status: SHIPPED | OUTPATIENT
Start: 2022-02-21

## 2022-02-21 NOTE — PATIENT INSTRUCTIONS
#1 dermatitis  Chronic hand dermatitis. Chronic prednisone usage through her dermatologist since October. Currently on 5 mg every other day with 4 days left  Tried topical steroids in the past.  No prior skin biopsy or patch testing per patient report. Patient being evaluated for Dupixent through dermatology  Follow-up with dermatology as scheduled. See above skin testing to common food and environmental allergens. #2 urticarial-like rash  2 months history of urticarial-like rash. Pictures reviewed  Handouts on urticaria provided and reviewed including most chronic hives longer than 6 weeks are idiopathic in nature  Trial of Xyzal, levocetirizine 5 g twice a day up to 4 times per day if needed    #3 allergic rhinitis  Handouts on allergies and avoidance measures provided and reviewed including the potential treatment option of immunotherapy  Xyzal 5 g once a day as an antihistamine. May add Flonase or Nasacort 2 sprays per nostril once if having prominent nasal congestion postnasal drip    #4 COVID vaccine up-to-date x2 doses. Recommend booster in near future    #5 flu vaccine recommended.   Patient interested but defers today

## 2022-02-22 ENCOUNTER — TELEPHONE (OUTPATIENT)
Dept: ALLERGY | Facility: CLINIC | Age: 32
End: 2022-02-22

## 2022-02-22 NOTE — TELEPHONE ENCOUNTER
Spoke with pharmacist, Carrie Segura at [address] [place] [address] regarding prescription for Xyzal 5mg tablets-1 tablet by mouth 2 times a day. Pharmacist states patient's insurance will only cover medication for 1 time a day. Informed pharmacist will contact patient to inform will need to purchase over the counter if needed more than once a day. pharmcist states will fill prescription for a 30 day supply. Left a message for patient her insurance will only cover for her allergy medication ( Xyzal ) for once a day and will need to purchase over the counter if needed more than once a day and to contact our office with any further questions.

## 2022-03-02 ENCOUNTER — TELEPHONE (OUTPATIENT)
Dept: DERMATOLOGY CLINIC | Facility: CLINIC | Age: 32
End: 2022-03-02

## 2022-03-02 NOTE — TELEPHONE ENCOUNTER
S/w drug rep Rakan Mcpherson for [employer] who presented today, she states pt is missing info on her patient assistnce program and should call dup[employer]ent [employer] at [phone]. S/w pt - pt states she just spoke with them and everything has been submitted but they need us to fax the denial letter to [employer]. Denial letter faxed.  (It's scanned under media)

## 2022-03-22 NOTE — TELEPHONE ENCOUNTER
Pt called the office to inform that she received her first [address] injection at home from [place] [address]. Pt scheduled for first injection and training in the office on 3/28/22.

## 2022-03-28 ENCOUNTER — NURSE ONLY (OUTPATIENT)
Dept: DERMATOLOGY CLINIC | Facility: CLINIC | Age: 32
End: 2022-03-28
Payer: COMMERCIAL

## 2022-03-28 DIAGNOSIS — L20.89 OTHER ATOPIC DERMATITIS: Primary | ICD-10-CM

## 2022-03-28 PROCEDURE — 96372 THER/PROPH/DIAG INJ SC/IM: CPT | Performed by: DERMATOLOGY

## 2022-04-04 NOTE — TELEPHONE ENCOUNTER
[facility] asking for pt's rx for Jake Maxin since they will be distributing this for patient moving forward (because pt is receiving Dupixent through [employer]). Prescription fax completed and faxed to [address] [place] [address] and sent to scan.

## 2022-04-04 NOTE — TELEPHONE ENCOUNTER
Fax from [employer]    Please complete fax for [address] fax in pa inbox

## 2022-04-25 ENCOUNTER — OFFICE VISIT (OUTPATIENT)
Dept: FAMILY MEDICINE CLINIC | Facility: CLINIC | Age: 32
End: 2022-04-25
Payer: COMMERCIAL

## 2022-04-25 VITALS
SYSTOLIC BLOOD PRESSURE: 134 MMHG | WEIGHT: 293 LBS | DIASTOLIC BLOOD PRESSURE: 78 MMHG | HEART RATE: 89 BPM | HEIGHT: 68 IN | BODY MASS INDEX: 44.41 KG/M2

## 2022-04-25 DIAGNOSIS — R06.02 SHORTNESS OF BREATH: ICD-10-CM

## 2022-04-25 DIAGNOSIS — M79.622 AXILLARY PAIN, LEFT: Primary | ICD-10-CM

## 2022-04-25 DIAGNOSIS — F41.9 ANXIETY: ICD-10-CM

## 2022-04-25 PROCEDURE — 99214 OFFICE O/P EST MOD 30 MIN: CPT | Performed by: STUDENT IN AN ORGANIZED HEALTH CARE EDUCATION/TRAINING PROGRAM

## 2022-04-25 PROCEDURE — 3078F DIAST BP <80 MM HG: CPT | Performed by: STUDENT IN AN ORGANIZED HEALTH CARE EDUCATION/TRAINING PROGRAM

## 2022-04-25 PROCEDURE — 3075F SYST BP GE 130 - 139MM HG: CPT | Performed by: STUDENT IN AN ORGANIZED HEALTH CARE EDUCATION/TRAINING PROGRAM

## 2022-04-25 PROCEDURE — 3008F BODY MASS INDEX DOCD: CPT | Performed by: STUDENT IN AN ORGANIZED HEALTH CARE EDUCATION/TRAINING PROGRAM

## 2022-04-25 RX ORDER — ALBUTEROL SULFATE 90 UG/1
2 AEROSOL, METERED RESPIRATORY (INHALATION) EVERY 4 HOURS PRN
Qty: 1 EACH | Refills: 0 | Status: SHIPPED | OUTPATIENT
Start: 2022-04-25 | End: 2023-04-25

## 2022-07-29 ENCOUNTER — OFFICE VISIT (OUTPATIENT)
Dept: FAMILY MEDICINE CLINIC | Facility: CLINIC | Age: 32
End: 2022-07-29
Payer: COMMERCIAL

## 2022-07-29 ENCOUNTER — NURSE TRIAGE (OUTPATIENT)
Dept: FAMILY MEDICINE CLINIC | Facility: CLINIC | Age: 32
End: 2022-07-29

## 2022-07-29 VITALS
RESPIRATION RATE: 18 BRPM | HEART RATE: 71 BPM | WEIGHT: 293 LBS | DIASTOLIC BLOOD PRESSURE: 85 MMHG | BODY MASS INDEX: 44.41 KG/M2 | HEIGHT: 68 IN | SYSTOLIC BLOOD PRESSURE: 132 MMHG

## 2022-07-29 DIAGNOSIS — L02.211 ABSCESS OF SKIN OF ABDOMEN: Primary | ICD-10-CM

## 2022-07-29 PROCEDURE — 3079F DIAST BP 80-89 MM HG: CPT | Performed by: STUDENT IN AN ORGANIZED HEALTH CARE EDUCATION/TRAINING PROGRAM

## 2022-07-29 PROCEDURE — 3075F SYST BP GE 130 - 139MM HG: CPT | Performed by: STUDENT IN AN ORGANIZED HEALTH CARE EDUCATION/TRAINING PROGRAM

## 2022-07-29 PROCEDURE — 99213 OFFICE O/P EST LOW 20 MIN: CPT | Performed by: STUDENT IN AN ORGANIZED HEALTH CARE EDUCATION/TRAINING PROGRAM

## 2022-07-29 PROCEDURE — 3008F BODY MASS INDEX DOCD: CPT | Performed by: STUDENT IN AN ORGANIZED HEALTH CARE EDUCATION/TRAINING PROGRAM

## 2022-07-29 PROCEDURE — 10060 I&D ABSCESS SIMPLE/SINGLE: CPT | Performed by: STUDENT IN AN ORGANIZED HEALTH CARE EDUCATION/TRAINING PROGRAM

## 2022-07-29 RX ORDER — SULFAMETHOXAZOLE AND TRIMETHOPRIM 800; 160 MG/1; MG/1
1 TABLET ORAL 2 TIMES DAILY
Qty: 14 TABLET | Refills: 0 | Status: SHIPPED | OUTPATIENT
Start: 2022-07-29 | End: 2022-08-05

## 2022-07-29 RX ORDER — CEPHALEXIN 500 MG/1
500 CAPSULE ORAL 4 TIMES DAILY
Qty: 28 CAPSULE | Refills: 0 | Status: SHIPPED | OUTPATIENT
Start: 2022-07-29 | End: 2022-08-05

## 2022-07-29 NOTE — TELEPHONE ENCOUNTER
Ok to wait until 330 today but can offer UC eval if pt prefers. She should also plan to schedule Derm appt to discuss recurrent skin issues while on Dupixent.

## 2022-08-02 ENCOUNTER — TELEPHONE (OUTPATIENT)
Dept: DERMATOLOGY CLINIC | Facility: CLINIC | Age: 32
End: 2022-08-02

## 2022-08-02 NOTE — TELEPHONE ENCOUNTER
Received a questionnaire from [facility] for [employer] reaction to [employer]    Per dermatology documentation, there has been no adverse reactions documented or reported by patient to derm staff. Dec 10, 2021 TE. Pt had acute issue with hives, rx prednisone. Then started dupixent, first injection given in office 3/28/22. No mention of adverse reactions at this time or since first documentation of patient receiving med.    7/29/22. Pt seen by Dr. Sabra Palencia. Pt suggesting she has had recurrent boils since starting dupixent. Boil to the right hip, drained. Culture and anx given by Dr. Sabra Palencia. Are recurrent boils a side effect of Dupixent? Please advise, ty.

## 2022-08-03 NOTE — TELEPHONE ENCOUNTER
Possible to have increased skin infections, uncommon. Since she was not seen by us, I cannot comment. She does need to schedule a f/u appt.

## 2022-08-15 ENCOUNTER — OFFICE VISIT (OUTPATIENT)
Dept: DERMATOLOGY CLINIC | Facility: CLINIC | Age: 32
End: 2022-08-15
Payer: COMMERCIAL

## 2022-08-15 DIAGNOSIS — Z51.81 MEDICATION MONITORING ENCOUNTER: ICD-10-CM

## 2022-08-15 DIAGNOSIS — M25.541 JOINT PAIN IN FINGERS OF BOTH HANDS: Primary | ICD-10-CM

## 2022-08-15 DIAGNOSIS — L20.89 OTHER ATOPIC DERMATITIS: ICD-10-CM

## 2022-08-15 DIAGNOSIS — M25.542 JOINT PAIN IN FINGERS OF BOTH HANDS: Primary | ICD-10-CM

## 2022-08-15 PROCEDURE — 99214 OFFICE O/P EST MOD 30 MIN: CPT | Performed by: DERMATOLOGY

## 2022-09-19 ENCOUNTER — LAB ENCOUNTER (OUTPATIENT)
Dept: LAB | Facility: HOSPITAL | Age: 32
End: 2022-09-19
Attending: INTERNAL MEDICINE

## 2022-09-19 ENCOUNTER — HOSPITAL ENCOUNTER (OUTPATIENT)
Dept: GENERAL RADIOLOGY | Facility: HOSPITAL | Age: 32
Discharge: HOME | End: 2022-09-19
Attending: INTERNAL MEDICINE

## 2022-09-19 ENCOUNTER — OFFICE VISIT (OUTPATIENT)
Dept: RHEUMATOLOGY | Facility: CLINIC | Age: 32
End: 2022-09-19

## 2022-09-19 VITALS
HEIGHT: 68 IN | SYSTOLIC BLOOD PRESSURE: 126 MMHG | DIASTOLIC BLOOD PRESSURE: 83 MMHG | HEART RATE: 73 BPM | RESPIRATION RATE: 16 BRPM | BODY MASS INDEX: 44.41 KG/M2 | WEIGHT: 293 LBS

## 2022-09-19 DIAGNOSIS — M25.50 POLYARTHRALGIA: ICD-10-CM

## 2022-09-19 DIAGNOSIS — M79.10 MYALGIA: ICD-10-CM

## 2022-09-19 DIAGNOSIS — M54.50 CHRONIC BILATERAL LOW BACK PAIN WITHOUT SCIATICA: ICD-10-CM

## 2022-09-19 DIAGNOSIS — G89.29 CHRONIC BILATERAL LOW BACK PAIN WITHOUT SCIATICA: ICD-10-CM

## 2022-09-19 DIAGNOSIS — M25.50 POLYARTHRALGIA: Primary | ICD-10-CM

## 2022-09-19 LAB
ALBUMIN SERPL-MCNC: 3.5 G/DL (ref 3.4–5)
ALBUMIN/GLOB SERPL: 1.1 {RATIO} (ref 1–2)
ALP LIVER SERPL-CCNC: 77 U/L
ALT SERPL-CCNC: 25 U/L
ANION GAP SERPL CALC-SCNC: 7 MMOL/L (ref 0–18)
AST SERPL-CCNC: 13 U/L (ref 15–37)
BILIRUB SERPL-MCNC: 0.3 MG/DL (ref 0.1–2)
BUN BLD-MCNC: 11 MG/DL (ref 7–18)
BUN/CREAT SERPL: 17.7 (ref 10–20)
CALCIUM BLD-MCNC: 8.8 MG/DL (ref 8.5–10.1)
CHLORIDE SERPL-SCNC: 107 MMOL/L (ref 98–112)
CK SERPL-CCNC: 96 U/L
CO2 SERPL-SCNC: 27 MMOL/L (ref 21–32)
CREAT BLD-MCNC: 0.62 MG/DL
CRP SERPL-MCNC: 0.6 MG/DL (ref ?–0.3)
DEPRECATED RDW RBC AUTO: 41 FL (ref 35.1–46.3)
ERYTHROCYTE [DISTWIDTH] IN BLOOD BY AUTOMATED COUNT: 12.6 % (ref 11–15)
ERYTHROCYTE [SEDIMENTATION RATE] IN BLOOD: 17 MM/HR
FASTING STATUS PATIENT QL REPORTED: NO
GFR SERPLBLD BASED ON 1.73 SQ M-ARVRAT: 123 ML/MIN/1.73M2 (ref 60–?)
GLOBULIN PLAS-MCNC: 3.3 G/DL (ref 2.8–4.4)
GLUCOSE BLD-MCNC: 90 MG/DL (ref 70–99)
HCT VFR BLD AUTO: 41.6 %
HCV AB SERPL QL IA: NONREACTIVE
HGB BLD-MCNC: 13.6 G/DL
MCH RBC QN AUTO: 29.6 PG (ref 26–34)
MCHC RBC AUTO-ENTMCNC: 32.7 G/DL (ref 31–37)
MCV RBC AUTO: 90.4 FL
OSMOLALITY SERPL CALC.SUM OF ELEC: 291 MOSM/KG (ref 275–295)
PLATELET # BLD AUTO: 306 10(3)UL (ref 150–450)
POTASSIUM SERPL-SCNC: 4.2 MMOL/L (ref 3.5–5.1)
PROT SERPL-MCNC: 6.8 G/DL (ref 6.4–8.2)
RBC # BLD AUTO: 4.6 X10(6)UL
RHEUMATOID FACT SERPL-ACNC: <10 IU/ML (ref ?–15)
SODIUM SERPL-SCNC: 141 MMOL/L (ref 136–145)
WBC # BLD AUTO: 7.3 X10(3) UL (ref 4–11)

## 2022-09-19 PROCEDURE — 86140 C-REACTIVE PROTEIN: CPT

## 2022-09-19 PROCEDURE — 82085 ASSAY OF ALDOLASE: CPT

## 2022-09-19 PROCEDURE — 36415 COLL VENOUS BLD VENIPUNCTURE: CPT

## 2022-09-19 PROCEDURE — 86803 HEPATITIS C AB TEST: CPT

## 2022-09-19 PROCEDURE — 86431 RHEUMATOID FACTOR QUANT: CPT

## 2022-09-19 PROCEDURE — 99244 OFF/OP CNSLTJ NEW/EST MOD 40: CPT | Performed by: INTERNAL MEDICINE

## 2022-09-19 PROCEDURE — 86036 ANCA SCREEN EACH ANTIBODY: CPT

## 2022-09-19 PROCEDURE — 3074F SYST BP LT 130 MM HG: CPT | Performed by: INTERNAL MEDICINE

## 2022-09-19 PROCEDURE — 86038 ANTINUCLEAR ANTIBODIES: CPT

## 2022-09-19 PROCEDURE — 86200 CCP ANTIBODY: CPT

## 2022-09-19 PROCEDURE — 85027 COMPLETE CBC AUTOMATED: CPT

## 2022-09-19 PROCEDURE — 72202 X-RAY EXAM SI JOINTS 3/> VWS: CPT | Performed by: INTERNAL MEDICINE

## 2022-09-19 PROCEDURE — 83516 IMMUNOASSAY NONANTIBODY: CPT

## 2022-09-19 PROCEDURE — 85652 RBC SED RATE AUTOMATED: CPT

## 2022-09-19 PROCEDURE — 82550 ASSAY OF CK (CPK): CPT

## 2022-09-19 PROCEDURE — 81374 HLA I TYPING 1 ANTIGEN LR: CPT

## 2022-09-19 PROCEDURE — 3008F BODY MASS INDEX DOCD: CPT | Performed by: INTERNAL MEDICINE

## 2022-09-19 PROCEDURE — 80053 COMPREHEN METABOLIC PANEL: CPT

## 2022-09-19 PROCEDURE — 3079F DIAST BP 80-89 MM HG: CPT | Performed by: INTERNAL MEDICINE

## 2022-09-19 RX ORDER — MELOXICAM 15 MG/1
15 TABLET ORAL DAILY
Qty: 30 TABLET | Refills: 1 | Status: SHIPPED | OUTPATIENT
Start: 2022-09-19

## 2022-09-19 NOTE — PATIENT INSTRUCTIONS
Summary:  1. Check labs   2. Check si joint xray   3. Consider and try meloxicam 15mg a day - take with food   4. Return to clinic in 2-3 weeks.

## 2022-09-20 LAB — CCP IGG SERPL-ACNC: 1.1 U/ML (ref 0–6.9)

## 2022-09-22 LAB
ALDOLASE, SERUM: 2.7 U/L
NUCLEAR IGG TITR SER IF: NEGATIVE {TITER}

## 2022-09-23 LAB — HLA-B27: NEGATIVE

## 2022-09-24 LAB
MYELOPEROX ANTIBODIES, IGG: 0 AU/ML
SERINE PROTEASE 3, IGG: 1 AU/ML

## 2022-10-11 ENCOUNTER — PATIENT MESSAGE (OUTPATIENT)
Dept: FAMILY MEDICINE CLINIC | Facility: CLINIC | Age: 32
End: 2022-10-11

## 2022-10-11 ENCOUNTER — NURSE TRIAGE (OUTPATIENT)
Dept: FAMILY MEDICINE CLINIC | Facility: CLINIC | Age: 32
End: 2022-10-11

## 2022-10-12 ENCOUNTER — TELEPHONE (OUTPATIENT)
Dept: FAMILY MEDICINE CLINIC | Facility: CLINIC | Age: 32
End: 2022-10-12

## 2022-10-12 NOTE — TELEPHONE ENCOUNTER
Pt sent [employer] message. Pt went to [facility] ER last night. Was prescribed antibiotics. Calling to schedule a follow-up appointment with Dr. Gamaliel Rodriguez. No appointments available this week or next week. Pt scheduled with Kali Brantley on 10/17. Pt asked to be put on the wait list.    Pt states she has questions as to why this is happening to her. This is the 2nd time it has occurred. Dr. Gamaliel Rodriguez did, you receive ER notes from [facility]?   Future Appointments   Date Time Provider Jorge Childs   10/17/2022 10:00 AM JARRETT Alexandre [facility]   10/27/2022 11:00 AM Garry Olguin MD [facility] [place]

## 2022-10-12 NOTE — TELEPHONE ENCOUNTER
From: [place]elinda [place]  To: Kail Rodriguez MD  Sent: 10/11/2022 2:58 PM CDT  Subject: I went to the er today based on the advice from the nurse in the office. Hi Dr Monse Cummings,  I had an appointment with you for tomorrow but was advised to go to the emergency room when I spoke with the nurse. I was having pain in my belly button but also a tugging from my belly button to my vagina area. I was having a lot of pressure, pain, and tenderness. The pain set in yesterday and the skin below my belly button started turning red. I called the office this morning seeing if I could get in sooner and explained the situation and then was told to go to the er. On the way to the er my belly button started to bleed or leak blood like fluid. The doctor said it was an abscess that has now started to drain. It looks like a mixture of blood and pus coming out of the belly button. Since the abscess popped the urinary issues have gotten better. They cultured my belly button at my request and cultured the urine to check for infection. He said the urine seemed fine but to recheck at a later date. He told me to see you in a week for a follow up as well. I will try to set that up with the office. He has me starting on an antibiotic Called cephalexin (keflex 500mg) by mouth 4 times a day. I am obviously concerned since I had an abscess at the end of July. I appreciate you in advance for taking the time to read this.

## 2022-10-17 ENCOUNTER — OFFICE VISIT (OUTPATIENT)
Dept: FAMILY MEDICINE CLINIC | Facility: CLINIC | Age: 32
End: 2022-10-17
Payer: COMMERCIAL

## 2022-10-17 VITALS
BODY MASS INDEX: 44.41 KG/M2 | OXYGEN SATURATION: 98 % | SYSTOLIC BLOOD PRESSURE: 153 MMHG | RESPIRATION RATE: 18 BRPM | DIASTOLIC BLOOD PRESSURE: 79 MMHG | TEMPERATURE: 99 F | HEIGHT: 68 IN | WEIGHT: 293 LBS | HEART RATE: 78 BPM

## 2022-10-17 DIAGNOSIS — L02.211 ABSCESS OF SKIN OF ABDOMEN: Primary | ICD-10-CM

## 2022-10-17 PROCEDURE — 3008F BODY MASS INDEX DOCD: CPT

## 2022-10-17 PROCEDURE — 3078F DIAST BP <80 MM HG: CPT

## 2022-10-17 PROCEDURE — 3077F SYST BP >= 140 MM HG: CPT

## 2022-10-17 PROCEDURE — 99213 OFFICE O/P EST LOW 20 MIN: CPT

## 2022-10-17 RX ORDER — CEPHALEXIN 500 MG/1
500 CAPSULE ORAL 4 TIMES DAILY
COMMUNITY
Start: 2022-10-12 | End: 2022-10-28

## 2022-10-23 NOTE — TELEPHONE ENCOUNTER
As of 10/21, no records from [facility] received. If she continues to have abscesses in the same exact location, she may have a sinus tract or fistula present that is the nidus of infection. We may need to continue imaging vs Surgery consult, if it is in the same area each time. I would recommend follow-up with Derm as well.

## 2022-10-24 NOTE — TELEPHONE ENCOUNTER
Verified name and . Patient was advised of Dr. Raul Riggs message. She states that the abscesses have been in various placed, by hip and by belly button. She states she will make an appointment to see Dr. Chad Crowder (dermatology). She asks when Dr. Preeti Sanches would like her to come in for follow up appointment.

## 2022-10-26 ENCOUNTER — TELEPHONE (OUTPATIENT)
Dept: FAMILY MEDICINE CLINIC | Facility: CLINIC | Age: 32
End: 2022-10-26

## 2022-10-26 ENCOUNTER — TELEPHONE (OUTPATIENT)
Dept: DERMATOLOGY CLINIC | Facility: CLINIC | Age: 32
End: 2022-10-26

## 2022-10-26 NOTE — TELEPHONE ENCOUNTER
Per Magdaleno Plascencia, the Dexa scan need new diagnosis code to pass the medical needs. Please put into the chart of patient, patient has an appointment 11/22/2022.

## 2022-10-26 NOTE — TELEPHONE ENCOUNTER
Pt has been off of her antibiotics since Friday. Pt states she woke up at 3am last night, felt pressure and tugging sensation from belly button. Noted reddish pinkish discharge again. Pt also reports pressure in the urethra with urinary frequency. Denies pain. Pt states she was advised by Igor Lewis to call back if occurs again. Pt states, \"if feels like the beginning stages of the infection again. \"  Pt states she will call dermatology to schedule an appointment. Pt requesting to see Dr. Fadia Norris tomorrow. No more available appointments. Declined other providers. Please advise. Pt requesting to see you tomorrow afternoon.

## 2022-10-26 NOTE — TELEPHONE ENCOUNTER
S/w pt - pt states she has had 2 abscesses to her abdomen since July. She was just treated for one to her umbilicus (finished her antibiotics last Friday) pt states her pain is coming back to umbilicus and spreading \"down to her urethra\" pt states she was told she may have a fissure. She states that Dr. Roxana Schaefer wanted her to see derm for this - please confirm if this is appropriate?

## 2022-10-26 NOTE — TELEPHONE ENCOUNTER
S/w pt. Pt states she does not know herself how large this is, states it's in her belly button but the pain does radiate down to her urethra/pelvis area. I did inform her that this is not really derm related, discussed with KMT, pt to follow up with PCP and possibly general surgery. Pt voiced understanding. Agrees to call Dr. Sara Lubin.

## 2022-10-27 ENCOUNTER — LAB ENCOUNTER (OUTPATIENT)
Dept: LAB | Age: 32
End: 2022-10-27
Attending: INTERNAL MEDICINE
Payer: COMMERCIAL

## 2022-10-27 ENCOUNTER — OFFICE VISIT (OUTPATIENT)
Dept: RHEUMATOLOGY | Facility: CLINIC | Age: 32
End: 2022-10-27
Payer: COMMERCIAL

## 2022-10-27 VITALS
SYSTOLIC BLOOD PRESSURE: 148 MMHG | WEIGHT: 293 LBS | HEIGHT: 68 IN | BODY MASS INDEX: 44.41 KG/M2 | HEART RATE: 80 BPM | DIASTOLIC BLOOD PRESSURE: 75 MMHG

## 2022-10-27 DIAGNOSIS — M79.10 MYALGIA: ICD-10-CM

## 2022-10-27 DIAGNOSIS — R30.0 DYSURIA: ICD-10-CM

## 2022-10-27 DIAGNOSIS — M25.50 POLYARTHRALGIA: Primary | ICD-10-CM

## 2022-10-27 LAB
BILIRUB UR QL: NEGATIVE
CLARITY UR: CLEAR
COLOR UR: YELLOW
GLUCOSE UR-MCNC: NEGATIVE MG/DL
HGB UR QL STRIP.AUTO: NEGATIVE
KETONES UR-MCNC: NEGATIVE MG/DL
LEUKOCYTE ESTERASE UR QL STRIP.AUTO: NEGATIVE
NITRITE UR QL STRIP.AUTO: NEGATIVE
PH UR: 8 [PH] (ref 5–8)
PROT UR-MCNC: NEGATIVE MG/DL
SP GR UR STRIP: 1.02 (ref 1–1.03)
UROBILINOGEN UR STRIP-ACNC: 0.2

## 2022-10-27 PROCEDURE — 3077F SYST BP >= 140 MM HG: CPT | Performed by: INTERNAL MEDICINE

## 2022-10-27 PROCEDURE — 87086 URINE CULTURE/COLONY COUNT: CPT

## 2022-10-27 PROCEDURE — 3078F DIAST BP <80 MM HG: CPT | Performed by: INTERNAL MEDICINE

## 2022-10-27 PROCEDURE — 81003 URINALYSIS AUTO W/O SCOPE: CPT

## 2022-10-27 PROCEDURE — 99214 OFFICE O/P EST MOD 30 MIN: CPT | Performed by: INTERNAL MEDICINE

## 2022-10-27 PROCEDURE — 3008F BODY MASS INDEX DOCD: CPT | Performed by: INTERNAL MEDICINE

## 2022-10-27 NOTE — PATIENT INSTRUCTIONS
1. try meloxicam 15mg a day - take with food   2. Check urine analysis and culture   3. Return to clinic in 6-12 months.

## 2022-10-28 ENCOUNTER — HOSPITAL ENCOUNTER (OUTPATIENT)
Dept: CT IMAGING | Facility: HOSPITAL | Age: 32
Discharge: HOME | End: 2022-10-28
Attending: STUDENT IN AN ORGANIZED HEALTH CARE EDUCATION/TRAINING PROGRAM
Payer: COMMERCIAL

## 2022-10-28 ENCOUNTER — OFFICE VISIT (OUTPATIENT)
Dept: FAMILY MEDICINE CLINIC | Facility: CLINIC | Age: 32
End: 2022-10-28
Payer: COMMERCIAL

## 2022-10-28 VITALS
HEART RATE: 89 BPM | DIASTOLIC BLOOD PRESSURE: 81 MMHG | RESPIRATION RATE: 18 BRPM | WEIGHT: 293 LBS | BODY MASS INDEX: 44.41 KG/M2 | SYSTOLIC BLOOD PRESSURE: 136 MMHG | HEIGHT: 68 IN

## 2022-10-28 DIAGNOSIS — L02.216 ABSCESS, UMBILICAL: ICD-10-CM

## 2022-10-28 DIAGNOSIS — R10.33 PERIUMBILICAL PAIN: ICD-10-CM

## 2022-10-28 DIAGNOSIS — R19.8 UMBILICAL DISCHARGE: Primary | ICD-10-CM

## 2022-10-28 DIAGNOSIS — R91.1 INCIDENTAL LUNG NODULE: ICD-10-CM

## 2022-10-28 LAB
CREAT BLD-MCNC: 0.5 MG/DL
GFR SERPLBLD BASED ON 1.73 SQ M-ARVRAT: 129 ML/MIN/1.73M2 (ref 60–?)

## 2022-10-28 PROCEDURE — 82565 ASSAY OF CREATININE: CPT

## 2022-10-28 PROCEDURE — 74177 CT ABD & PELVIS W/CONTRAST: CPT | Performed by: STUDENT IN AN ORGANIZED HEALTH CARE EDUCATION/TRAINING PROGRAM

## 2022-10-28 PROCEDURE — 3075F SYST BP GE 130 - 139MM HG: CPT | Performed by: STUDENT IN AN ORGANIZED HEALTH CARE EDUCATION/TRAINING PROGRAM

## 2022-10-28 PROCEDURE — 3008F BODY MASS INDEX DOCD: CPT | Performed by: STUDENT IN AN ORGANIZED HEALTH CARE EDUCATION/TRAINING PROGRAM

## 2022-10-28 PROCEDURE — 99214 OFFICE O/P EST MOD 30 MIN: CPT | Performed by: STUDENT IN AN ORGANIZED HEALTH CARE EDUCATION/TRAINING PROGRAM

## 2022-10-28 PROCEDURE — 3079F DIAST BP 80-89 MM HG: CPT | Performed by: STUDENT IN AN ORGANIZED HEALTH CARE EDUCATION/TRAINING PROGRAM

## 2022-10-28 RX ORDER — DOXYCYCLINE HYCLATE 100 MG
100 TABLET ORAL 2 TIMES DAILY
Qty: 20 TABLET | Refills: 0 | Status: SHIPPED | OUTPATIENT
Start: 2022-10-28 | End: 2022-11-07

## 2022-10-28 RX ORDER — TRAMADOL HYDROCHLORIDE 50 MG/1
50 TABLET ORAL EVERY 6 HOURS PRN
Qty: 30 TABLET | Refills: 0 | Status: SHIPPED | OUTPATIENT
Start: 2022-10-28

## 2022-10-31 ENCOUNTER — TELEPHONE (OUTPATIENT)
Dept: FAMILY MEDICINE CLINIC | Facility: CLINIC | Age: 32
End: 2022-10-31

## 2022-10-31 ENCOUNTER — PATIENT MESSAGE (OUTPATIENT)
Dept: FAMILY MEDICINE CLINIC | Facility: CLINIC | Age: 32
End: 2022-10-31

## 2022-10-31 ENCOUNTER — HOSPITAL ENCOUNTER (EMERGENCY)
Facility: HOSPITAL | Age: 32
Discharge: HOME | End: 2022-11-01
Attending: EMERGENCY MEDICINE
Payer: COMMERCIAL

## 2022-10-31 DIAGNOSIS — L03.90 CELLULITIS, UNSPECIFIED CELLULITIS SITE: Primary | ICD-10-CM

## 2022-10-31 LAB
ANION GAP SERPL CALC-SCNC: 11 MMOL/L (ref 0–18)
BASOPHILS # BLD AUTO: 0.04 X10(3) UL (ref 0–0.2)
BASOPHILS NFR BLD AUTO: 0.3 %
BUN BLD-MCNC: 11 MG/DL (ref 7–18)
BUN/CREAT SERPL: 16.4 (ref 10–20)
CALCIUM BLD-MCNC: 8.8 MG/DL (ref 8.5–10.1)
CHLORIDE SERPL-SCNC: 106 MMOL/L (ref 98–112)
CO2 SERPL-SCNC: 23 MMOL/L (ref 21–32)
CREAT BLD-MCNC: 0.67 MG/DL
DEPRECATED RDW RBC AUTO: 42.2 FL (ref 35.1–46.3)
EOSINOPHIL # BLD AUTO: 0.07 X10(3) UL (ref 0–0.7)
EOSINOPHIL NFR BLD AUTO: 0.6 %
ERYTHROCYTE [DISTWIDTH] IN BLOOD BY AUTOMATED COUNT: 12.7 % (ref 11–15)
GFR SERPLBLD BASED ON 1.73 SQ M-ARVRAT: 121 ML/MIN/1.73M2 (ref 60–?)
GLUCOSE BLD-MCNC: 127 MG/DL (ref 70–99)
HCT VFR BLD AUTO: 42.7 %
HGB BLD-MCNC: 13.9 G/DL
IMM GRANULOCYTES # BLD AUTO: 0.03 X10(3) UL (ref 0–1)
IMM GRANULOCYTES NFR BLD: 0.2 %
LYMPHOCYTES # BLD AUTO: 2.92 X10(3) UL (ref 1–4)
LYMPHOCYTES NFR BLD AUTO: 23.3 %
MCH RBC QN AUTO: 29.3 PG (ref 26–34)
MCHC RBC AUTO-ENTMCNC: 32.6 G/DL (ref 31–37)
MCV RBC AUTO: 89.9 FL
MONOCYTES # BLD AUTO: 0.75 X10(3) UL (ref 0.1–1)
MONOCYTES NFR BLD AUTO: 6 %
NEUTROPHILS # BLD AUTO: 8.7 X10 (3) UL (ref 1.5–7.7)
NEUTROPHILS # BLD AUTO: 8.7 X10(3) UL (ref 1.5–7.7)
NEUTROPHILS NFR BLD AUTO: 69.6 %
OSMOLALITY SERPL CALC.SUM OF ELEC: 291 MOSM/KG (ref 275–295)
PLATELET # BLD AUTO: 324 10(3)UL (ref 150–450)
POTASSIUM SERPL-SCNC: 3.8 MMOL/L (ref 3.5–5.1)
RBC # BLD AUTO: 4.75 X10(6)UL
SODIUM SERPL-SCNC: 140 MMOL/L (ref 136–145)
WBC # BLD AUTO: 12.5 X10(3) UL (ref 4–11)

## 2022-10-31 PROCEDURE — 80048 BASIC METABOLIC PNL TOTAL CA: CPT | Performed by: EMERGENCY MEDICINE

## 2022-10-31 PROCEDURE — 99284 EMERGENCY DEPT VISIT MOD MDM: CPT

## 2022-10-31 PROCEDURE — 96365 THER/PROPH/DIAG IV INF INIT: CPT

## 2022-10-31 PROCEDURE — 85025 COMPLETE CBC W/AUTO DIFF WBC: CPT | Performed by: EMERGENCY MEDICINE

## 2022-10-31 RX ORDER — AMOXICILLIN AND CLAVULANATE POTASSIUM 875; 125 MG/1; MG/1
1 TABLET, FILM COATED ORAL 2 TIMES DAILY
Qty: 20 TABLET | Refills: 0 | Status: SHIPPED | OUTPATIENT
Start: 2022-10-31 | End: 2022-11-10

## 2022-10-31 NOTE — TELEPHONE ENCOUNTER
Patient called, verified Name and . She was seen by Dr. Sandi Germain on  for umbilical discharge and pain. Imaging done and was prescribed antibiotic and tramadol. Patient states that she did get relief from tramadol however, she thinks that pain is more intense especially with movement, and that it radiates outward when standing. Belly button redder and warmer to touch as well. Advised to call Surgery and see if she can be seen sooner than . If not, she can go to ED for prompt evaluation and treatment. Patient verbalized understanding and agreed with plan of care.

## 2022-10-31 NOTE — TELEPHONE ENCOUNTER
Patient contacted clinic again. Unable to obtain sooner appt with surgery. Redness and swelling are extending further out from umbilicus. Skin is sensitive and warm to touch. Feels she is dealing more with cellulitis than hernia type symptoms. Denies fever, body aches or chills. Feels she may need a different antibiotic. Recent culture taken. Dr. Talat Ferreira please advise.

## 2022-10-31 NOTE — TELEPHONE ENCOUNTER
From: Paula Boyd  To: Dyan Carroll MD  Sent: 10/31/2022 11:28 AM CDT  Subject: Providing photos per nurse request     These are three photos. First photo from Friday just pink in belly button. Sunday pink spread. Today it is slightly puffy and redness is moving further out. Pain has increased as days go by. I have been sitting and laying since Friday.  Thank you

## 2022-10-31 NOTE — ED INITIAL ASSESSMENT (HPI)
Patient recently treated for cellulitis to umbilicus; on antibiotics and finished this past Wednesday. Since Friday, redness re-occurring and c/o burning pain to abd.

## 2022-11-01 ENCOUNTER — TELEPHONE (OUTPATIENT)
Dept: FAMILY MEDICINE CLINIC | Facility: CLINIC | Age: 32
End: 2022-11-01

## 2022-11-01 ENCOUNTER — HOSPITAL ENCOUNTER (OUTPATIENT)
Facility: HOSPITAL | Age: 32
Setting detail: OBSERVATION
Discharge: HOME | End: 2022-11-02
Attending: EMERGENCY MEDICINE | Admitting: HOSPITALIST
Payer: COMMERCIAL

## 2022-11-01 ENCOUNTER — OFFICE VISIT (OUTPATIENT)
Dept: FAMILY MEDICINE CLINIC | Facility: CLINIC | Age: 32
End: 2022-11-01
Payer: COMMERCIAL

## 2022-11-01 VITALS
SYSTOLIC BLOOD PRESSURE: 105 MMHG | WEIGHT: 293 LBS | TEMPERATURE: 97 F | DIASTOLIC BLOOD PRESSURE: 63 MMHG | RESPIRATION RATE: 20 BRPM | OXYGEN SATURATION: 100 % | HEART RATE: 95 BPM | HEIGHT: 68 IN | BODY MASS INDEX: 44.41 KG/M2

## 2022-11-01 VITALS
RESPIRATION RATE: 20 BRPM | HEART RATE: 90 BPM | TEMPERATURE: 100 F | OXYGEN SATURATION: 99 % | SYSTOLIC BLOOD PRESSURE: 137 MMHG | DIASTOLIC BLOOD PRESSURE: 82 MMHG

## 2022-11-01 DIAGNOSIS — L03.316 CELLULITIS, UMBILICAL: Primary | ICD-10-CM

## 2022-11-01 DIAGNOSIS — L03.316 CELLULITIS OF UMBILICUS: Primary | ICD-10-CM

## 2022-11-01 PROBLEM — L03.90 CELLULITIS: Status: ACTIVE | Noted: 2022-11-01

## 2022-11-01 LAB
ANION GAP SERPL CALC-SCNC: 6 MMOL/L (ref 0–18)
B-HCG UR QL: NEGATIVE
BASOPHILS # BLD AUTO: 0.03 X10(3) UL (ref 0–0.2)
BASOPHILS NFR BLD AUTO: 0.3 %
BUN BLD-MCNC: 9 MG/DL (ref 7–18)
BUN/CREAT SERPL: 14.5 (ref 10–20)
CALCIUM BLD-MCNC: 9.2 MG/DL (ref 8.5–10.1)
CHLORIDE SERPL-SCNC: 106 MMOL/L (ref 98–112)
CO2 SERPL-SCNC: 25 MMOL/L (ref 21–32)
CREAT BLD-MCNC: 0.62 MG/DL
DEPRECATED RDW RBC AUTO: 41.1 FL (ref 35.1–46.3)
EOSINOPHIL # BLD AUTO: 0.03 X10(3) UL (ref 0–0.7)
EOSINOPHIL NFR BLD AUTO: 0.3 %
ERYTHROCYTE [DISTWIDTH] IN BLOOD BY AUTOMATED COUNT: 12.5 % (ref 11–15)
GFR SERPLBLD BASED ON 1.73 SQ M-ARVRAT: 123 ML/MIN/1.73M2 (ref 60–?)
GLUCOSE BLD-MCNC: 110 MG/DL (ref 70–99)
HCT VFR BLD AUTO: 41.4 %
HGB BLD-MCNC: 13.8 G/DL
IMM GRANULOCYTES # BLD AUTO: 0.03 X10(3) UL (ref 0–1)
IMM GRANULOCYTES NFR BLD: 0.3 %
LYMPHOCYTES # BLD AUTO: 1.74 X10(3) UL (ref 1–4)
LYMPHOCYTES NFR BLD AUTO: 17.2 %
MCH RBC QN AUTO: 29.7 PG (ref 26–34)
MCHC RBC AUTO-ENTMCNC: 33.3 G/DL (ref 31–37)
MCV RBC AUTO: 89 FL
MONOCYTES # BLD AUTO: 0.49 X10(3) UL (ref 0.1–1)
MONOCYTES NFR BLD AUTO: 4.8 %
NEUTROPHILS # BLD AUTO: 7.81 X10 (3) UL (ref 1.5–7.7)
NEUTROPHILS # BLD AUTO: 7.81 X10(3) UL (ref 1.5–7.7)
NEUTROPHILS NFR BLD AUTO: 77.1 %
OSMOLALITY SERPL CALC.SUM OF ELEC: 283 MOSM/KG (ref 275–295)
PLATELET # BLD AUTO: 314 10(3)UL (ref 150–450)
POTASSIUM SERPL-SCNC: 4.2 MMOL/L (ref 3.5–5.1)
RBC # BLD AUTO: 4.65 X10(6)UL
SARS-COV-2 RNA RESP QL NAA+PROBE: NOT DETECTED
SODIUM SERPL-SCNC: 137 MMOL/L (ref 136–145)
WBC # BLD AUTO: 10.1 X10(3) UL (ref 4–11)

## 2022-11-01 PROCEDURE — 99213 OFFICE O/P EST LOW 20 MIN: CPT

## 2022-11-01 PROCEDURE — 3079F DIAST BP 80-89 MM HG: CPT

## 2022-11-01 PROCEDURE — 99219 INITIAL OBSERVATION CARE,LEVL II: CPT | Performed by: HOSPITALIST

## 2022-11-01 PROCEDURE — 3075F SYST BP GE 130 - 139MM HG: CPT

## 2022-11-01 RX ORDER — ENOXAPARIN SODIUM 100 MG/ML
0.5 INJECTION SUBCUTANEOUS EVERY 24 HOURS
Status: DISCONTINUED | OUTPATIENT
Start: 2022-11-02 | End: 2022-11-02

## 2022-11-01 RX ORDER — HYDROCODONE BITARTRATE AND ACETAMINOPHEN 5; 325 MG/1; MG/1
2 TABLET ORAL EVERY 4 HOURS PRN
Status: DISCONTINUED | OUTPATIENT
Start: 2022-11-01 | End: 2022-11-02

## 2022-11-01 RX ORDER — ONDANSETRON 2 MG/ML
4 INJECTION INTRAMUSCULAR; INTRAVENOUS EVERY 6 HOURS PRN
Status: DISCONTINUED | OUTPATIENT
Start: 2022-11-01 | End: 2022-11-01

## 2022-11-01 RX ORDER — ACETAMINOPHEN 500 MG
500 TABLET ORAL EVERY 4 HOURS PRN
Status: DISCONTINUED | OUTPATIENT
Start: 2022-11-01 | End: 2022-11-02

## 2022-11-01 RX ORDER — HYDROCODONE BITARTRATE AND ACETAMINOPHEN 5; 325 MG/1; MG/1
1 TABLET ORAL EVERY 4 HOURS PRN
Status: DISCONTINUED | OUTPATIENT
Start: 2022-11-01 | End: 2022-11-02

## 2022-11-01 RX ORDER — PROCHLORPERAZINE EDISYLATE 5 MG/ML
5 INJECTION INTRAMUSCULAR; INTRAVENOUS EVERY 8 HOURS PRN
Status: DISCONTINUED | OUTPATIENT
Start: 2022-11-01 | End: 2022-11-02

## 2022-11-01 RX ORDER — TEMAZEPAM 15 MG/1
15 CAPSULE ORAL NIGHTLY PRN
Status: DISCONTINUED | OUTPATIENT
Start: 2022-11-01 | End: 2022-11-02

## 2022-11-01 RX ORDER — ONDANSETRON 2 MG/ML
4 INJECTION INTRAMUSCULAR; INTRAVENOUS EVERY 4 HOURS PRN
Status: ACTIVE | OUTPATIENT
Start: 2022-11-01 | End: 2022-11-01

## 2022-11-01 RX ORDER — ENOXAPARIN SODIUM 100 MG/ML
40 INJECTION SUBCUTANEOUS DAILY
Status: DISCONTINUED | OUTPATIENT
Start: 2022-11-01 | End: 2022-11-01

## 2022-11-01 RX ORDER — ALBUTEROL SULFATE 90 UG/1
2 AEROSOL, METERED RESPIRATORY (INHALATION) EVERY 4 HOURS PRN
Status: DISCONTINUED | OUTPATIENT
Start: 2022-11-01 | End: 2022-11-02

## 2022-11-01 RX ORDER — ACETAMINOPHEN 325 MG/1
650 TABLET ORAL EVERY 4 HOURS PRN
Status: DISCONTINUED | OUTPATIENT
Start: 2022-11-01 | End: 2022-11-02

## 2022-11-01 RX ORDER — TRAMADOL HYDROCHLORIDE 50 MG/1
50 TABLET ORAL EVERY 6 HOURS PRN
Status: DISCONTINUED | OUTPATIENT
Start: 2022-11-01 | End: 2022-11-02

## 2022-11-01 NOTE — H&P
[facility]    PATIENT'S NAME: Aundrea SNYDER   ATTENDING PHYSICIAN: Avi Saravia. Robson Fowler MD   PATIENT ACCOUNT#:   [id number]    LOCATION:  [place] [address]  MEDICAL RECORD #:   [id number]       YOB: 1990  ADMISSION DATE:       11/01/2022    HISTORY AND PHYSICAL EXAMINATION    CHIEF COMPLAINT:  Umbilical and abdominal wall cellulitis, did not respond to outpatient treatment. HISTORY OF PRESENT ILLNESS:  Patient is a 21-year-old  female who was seen in the emergency room yesterday for umbilical erythema and drainage. She had a CT scan of the abdomen which showed cellulitic changes, but no deep abscess. Culture was obtained which grew Prevotella, and started on doxycycline. Today she was seen in a followup appointment and she was noted to have cellulitic streaking into the infraumbilical abdominal wall area. Came back to the emergency department for evaluation. CBC yesterday was 12.5, white blood cell count with left shift. Today she was started on IV Unasyn, and she will be admitted to the hospital for further management. PAST MEDICAL HISTORY:  Eczema, osteoarthritis, asthma and anxiety disorder. PAST SURGICAL HISTORY:  Tonsillectomy. FAMILY HISTORY:  Positive for mother had breast cancer. SOCIAL HISTORY:  No tobacco, alcohol, or drug use. Lives by herself. Usually independent with basic activities of daily living. REVIEW OF SYSTEMS:  Drainage and irritation started around 2 to 3 days ago. Today she started noticing erythema extending in the infraumbilical abdominal wall area. Drainage, some pain, she had low-grade fever. Other 12-point review of systems negative. PHYSICAL EXAMINATION:    GENERAL:  Alert and oriented to time, place and person. Anxious. No acute distress. VITAL SIGNS:  Temperature 99.6, pulse 101, respiratory rate 24, blood pressure 168/92, pulse ox 100% on room air. HEENT:  Atraumatic. Oropharynx clear.   Moist mucous membranes. Ears, nose normal.  Eyes:  Anicteric sclerae. NECK:  Supple. No lymphadenopathy. Trachea midline. Full range of motion. LUNGS:  Clear to auscultation bilaterally. Normal respiratory effort. HEART:  Regular rate and rhythm. S1, S2 auscultated. No murmur. ABDOMEN:  Soft, obese. Positive bowel sounds. Umbilical area with skin irritation, erythema. Erythema streaking down the abdominal wall, infraumbilical area. No fluctuation or abscess formation. Minimal amount of drainage. EXTREMITIES:  Nonpitting edema, both legs. No clubbing or cyanosis. NEUROLOGIC:  Motor and sensory intact. ASSESSMENT AND PLAN:  Umbilical and abdominal wall cellulitis. Culture growing Prevotella species, which is an anaerobe. Patient had prior blood cultures of Staphylococcus aureus. She had prior culture of Staphylococcus aureus from the abdomen, which is pansensitive. We will start patient on Unasyn and monitor her clinical status closely. Observe overnight. DVT prophylaxis. Pain control. Further recommendations to follow.     Dictated By Ellie Juarez MD  d: 11/01/2022 16:30:50  t: 11/01/2022 17:48:33  Job [id number]  [facility]/

## 2022-11-01 NOTE — PLAN OF CARE
Patient is a/ox4. RA. Self care. IV antibiotics started and continued. Will continue to monitor. Problem: Patient Centered Care  Goal: Patient preferences are identified and integrated in the patient's plan of care  Description: Interventions:  - What would you like us to know as we care for you?  I live with my boyfriend  - Provide timely, complete, and accurate information to patient/family  - Incorporate patient and family knowledge, values, beliefs, and cultural backgrounds into the planning and delivery of care  - Encourage patient/family to participate in care and decision-making at the level they choose  - Honor patient and family perspectives and choices  Outcome: Progressing     Problem: Patient/Family Goals  Goal: Patient/Family Long Term Goal  Description: Patient's Long Term Goal: go home to be with my cats    Interventions:  - IV antibiotics, testing, follow physician orders  - See additional Care Plan goals for specific interventions  Outcome: Progressing  Goal: Patient/Family Short Term Goal  Description: Patient's Short Term Goal: get rid of cellulitis around belly button    Interventions:   - IV antibiotics, monitor the site, follow physician orders, comfort measures  - See additional Care Plan goals for specific interventions  Outcome: Progressing     Problem: DISCHARGE PLANNING  Goal: Discharge to home or other facility with appropriate resources  Description: INTERVENTIONS:  - Identify barriers to discharge w/pt and caregiver  - Include patient/family/discharge partner in discharge planning  - Arrange for needed discharge resources and transportation as appropriate  - Identify discharge learning needs (meds, wound care, etc)  - Arrange for interpreters to assist at discharge as needed  - Consider post-discharge preferences of patient/family/discharge partner  - Complete POLST form as appropriate  - Assess patient's ability to be responsible for managing their own health  - Refer to Case Management Department for coordinating discharge planning if the patient needs post-hospital services based on physician/LIP order or complex needs related to functional status, cognitive ability or social support system  Outcome: Progressing     Problem: SKIN/TISSUE INTEGRITY - ADULT  Goal: Incision(s), wounds(s) or drain site(s) healing without S/S of infection  Description: INTERVENTIONS:  - Assess and document risk factors for pressure ulcer development  - Assess and document skin integrity  - Assess and document dressing/incision, wound bed, drain sites and surrounding tissue  - Implement wound care per orders  - Initiate isolation precautions as appropriate  - Initiate Pressure Ulcer prevention bundle as indicated  Outcome: Progressing

## 2022-11-01 NOTE — ED INITIAL ASSESSMENT (HPI)
PT TO ER STATES SHE WAS SEEN IN THIS ER YESTERDAY FOR CELLULITIS IN HER UMBILICUS. STATES SHE WAS TREATED WITH IV ANTIBIOTICS. PT STATES SHE WOKE UP THIS MORNING WITH \"PUDDLING\" IN HER UMBILICUS AND STATES IT HAS BEEN BLEEDING SINCE. THE AREA IS CURRENTLY DRESSED AND WRAPPED. PT STATES SHE BELIEVES THE REDNESS IS ALSO SPREADING. PT STATES SHE WAS SENT HERE BY HER PCP THAT SHE SAW PTA.

## 2022-11-01 NOTE — ED QUICK NOTES
Orders for admission, patient is aware of plan and ready to go upstairs. Any questions, please call ED AXEL Frazier at extension [phone]. Patient Covid vaccination status: Fully vaccinated     COVID Test Ordered in ED: Rapid SARS-CoV-2 by PCR    COVID Suspicion at Admission: Low clinical suspicion for COVID    Running Infusions:  Unasyn. Mental Status/LOC at time of transport: Alert    Other pertinent information:     Patient is A&Ox4, ambulatory and independent.

## 2022-11-01 NOTE — TELEPHONE ENCOUNTER
Dr. Supriya Clark (Dr. Raul Riggs regular day off, routing to you in case any further action needed today, but pt going to ER now.)    Patient called office. Patient's date of birth and full name both confirmed. Asking to see Dr. Preeti Sanches today. Went to ER yesterday. Cellulitis - IV antibiotics given, per patient. Patient put papertowel on umbilicus overnight. And papertowel is saturated with red drainage. Steven Monterroso continues to drain red blood - pt has to change paper towel often - about 5 times this morning - saturated each time. RN advised ER now. For evaluation and immediate treatment. Advised Office visit and Immediate Care do not have the treatment and testing capabilities of ER. She verbalizes understanding of all information, and agreeable to plan.

## 2022-11-01 NOTE — TELEPHONE ENCOUNTER
Pt was seen in ED 66/39/22 for umbilical pain and drainage. Dx with cellulitis and culture shows prevotella. Pt taking amoxicillin 875-125mg one tablet in am and one tablet in pm for 10 days. Abdominal pain minimal now but concerned about the bloody drainage pooling around the umbilical area. Seen small amount of yellow drainage as well. Pt requesting to speak with Dr Mario Grayson, but informed out of office today. Scheduled follow up appt with Felisa FARIAS 11/1/22 @ 1:00 pm. In meantime, pt to maintain umbilical area clean with soap and water, keep dry and covered with dressing.    Please reply to pool: SHANTI Souza

## 2022-11-02 VITALS
TEMPERATURE: 98 F | RESPIRATION RATE: 18 BRPM | OXYGEN SATURATION: 98 % | WEIGHT: 293 LBS | SYSTOLIC BLOOD PRESSURE: 153 MMHG | HEART RATE: 79 BPM | DIASTOLIC BLOOD PRESSURE: 99 MMHG | BODY MASS INDEX: 52 KG/M2

## 2022-11-02 LAB
ANION GAP SERPL CALC-SCNC: 5 MMOL/L (ref 0–18)
BASOPHILS # BLD AUTO: 0.03 X10(3) UL (ref 0–0.2)
BASOPHILS NFR BLD AUTO: 0.4 %
BUN BLD-MCNC: 9 MG/DL (ref 7–18)
BUN/CREAT SERPL: 15.8 (ref 10–20)
C DIFF TOX B STL QL: NEGATIVE
CALCIUM BLD-MCNC: 8.8 MG/DL (ref 8.5–10.1)
CHLORIDE SERPL-SCNC: 107 MMOL/L (ref 98–112)
CO2 SERPL-SCNC: 27 MMOL/L (ref 21–32)
CREAT BLD-MCNC: 0.57 MG/DL
DEPRECATED RDW RBC AUTO: 41.1 FL (ref 35.1–46.3)
EOSINOPHIL # BLD AUTO: 0.09 X10(3) UL (ref 0–0.7)
EOSINOPHIL NFR BLD AUTO: 1.2 %
ERYTHROCYTE [DISTWIDTH] IN BLOOD BY AUTOMATED COUNT: 12.4 % (ref 11–15)
GFR SERPLBLD BASED ON 1.73 SQ M-ARVRAT: 125 ML/MIN/1.73M2 (ref 60–?)
GLUCOSE BLD-MCNC: 95 MG/DL (ref 70–99)
HCT VFR BLD AUTO: 40.6 %
HGB BLD-MCNC: 13.3 G/DL
IMM GRANULOCYTES # BLD AUTO: 0.01 X10(3) UL (ref 0–1)
IMM GRANULOCYTES NFR BLD: 0.1 %
LYMPHOCYTES # BLD AUTO: 2.36 X10(3) UL (ref 1–4)
LYMPHOCYTES NFR BLD AUTO: 30.9 %
MCH RBC QN AUTO: 29.4 PG (ref 26–34)
MCHC RBC AUTO-ENTMCNC: 32.8 G/DL (ref 31–37)
MCV RBC AUTO: 89.6 FL
MONOCYTES # BLD AUTO: 0.54 X10(3) UL (ref 0.1–1)
MONOCYTES NFR BLD AUTO: 7.1 %
NEUTROPHILS # BLD AUTO: 4.6 X10 (3) UL (ref 1.5–7.7)
NEUTROPHILS # BLD AUTO: 4.6 X10(3) UL (ref 1.5–7.7)
NEUTROPHILS NFR BLD AUTO: 60.3 %
OSMOLALITY SERPL CALC.SUM OF ELEC: 286 MOSM/KG (ref 275–295)
PLATELET # BLD AUTO: 302 10(3)UL (ref 150–450)
POTASSIUM SERPL-SCNC: 3.7 MMOL/L (ref 3.5–5.1)
RBC # BLD AUTO: 4.53 X10(6)UL
SODIUM SERPL-SCNC: 139 MMOL/L (ref 136–145)
WBC # BLD AUTO: 7.6 X10(3) UL (ref 4–11)

## 2022-11-02 PROCEDURE — 99217 OBSERVATION CARE DISCHARGE: CPT | Performed by: INTERNAL MEDICINE

## 2022-11-02 NOTE — PLAN OF CARE
No pain overnight. Umbilical area red with some red/brown drainage, bandage applied. VSS. Iv abx infusing. Problem: Patient Centered Care  Goal: Patient preferences are identified and integrated in the patient's plan of care  Description: Interventions:  - What would you like us to know as we care for you?  Im nervous about being in the hospital  - Provide timely, complete, and accurate information to patient/family  - Incorporate patient and family knowledge, values, beliefs, and cultural backgrounds into the planning and delivery of care  - Encourage patient/family to participate in care and decision-making at the level they choose  - Honor patient and family perspectives and choices  Outcome: Progressing     Problem: Patient/Family Goals  Goal: Patient/Family Long Term Goal  Description: Patient's Long Term Goal: go home to be with my cats    Interventions:  - IV antibiotics, testing, follow physician orders  - See additional Care Plan goals for specific interventions  Outcome: Progressing  Goal: Patient/Family Short Term Goal  Description: Patient's Short Term Goal: get rid of cellulitis around belly button    Interventions:   - IV antibiotics, monitor the site, follow physician orders, comfort measures  - See additional Care Plan goals for specific interventions  Outcome: Progressing     Problem: DISCHARGE PLANNING  Goal: Discharge to home or other facility with appropriate resources  Description: INTERVENTIONS:  - Identify barriers to discharge w/pt and caregiver  - Include patient/family/discharge partner in discharge planning  - Arrange for needed discharge resources and transportation as appropriate  - Identify discharge learning needs (meds, wound care, etc)  - Arrange for interpreters to assist at discharge as needed  - Consider post-discharge preferences of patient/family/discharge partner  - Complete POLST form as appropriate  - Assess patient's ability to be responsible for managing their own health  - Refer to Case Management Department for coordinating discharge planning if the patient needs post-hospital services based on physician/LIP order or complex needs related to functional status, cognitive ability or social support system  Outcome: Progressing     Problem: SKIN/TISSUE INTEGRITY - ADULT  Goal: Incision(s), wounds(s) or drain site(s) healing without S/S of infection  Description: INTERVENTIONS:  - Assess and document risk factors for pressure ulcer development  - Assess and document skin integrity  - Assess and document dressing/incision, wound bed, drain sites and surrounding tissue  - Implement wound care per orders  - Initiate isolation precautions as appropriate  - Initiate Pressure Ulcer prevention bundle as indicated  Outcome: Progressing

## 2022-11-02 NOTE — PROGRESS NOTES
Patient was provided with discharge instructions, education, and follow up information. Prescriptions were already sent electronically to patient's pharmacy. Patient verbalizes understanding of follow up information, specifically monitoring site, keeping area clean, and completing course of antibiotics. Patient has no questions after reviewing all instructions and will be going Home.      Lisset Hunter RN

## 2022-11-02 NOTE — PLAN OF CARE
RA. Afebrile. Umbilicus with scant serous drainage. WBC trending down. IV abx transitioned to PO at [place]. CDiff sample sent, awaiting results. Plan for home after evening dose of IV abx. Family updated at bedside. Problem: Patient Centered Care  Goal: Patient preferences are identified and integrated in the patient's plan of care  Description: Interventions:  - What would you like us to know as we care for you?  I am a   - Provide timely, complete, and accurate information to patient/family  - Incorporate patient and family knowledge, values, beliefs, and cultural backgrounds into the planning and delivery of care  - Encourage patient/family to participate in care and decision-making at the level they choose  - Honor patient and family perspectives and choices  Outcome: Progressing     Problem: Patient/Family Goals  Goal: Patient/Family Long Term Goal  Description: Patient's Long Term Goal: go home to be with my cats    Interventions:  - IV antibiotics, testing, follow physician orders  - See additional Care Plan goals for specific interventions  Outcome: Progressing  Goal: Patient/Family Short Term Goal  Description: Patient's Short Term Goal: get rid of cellulitis around belly button    Interventions:   - IV antibiotics, monitor the site, follow physician orders, comfort measures  - See additional Care Plan goals for specific interventions  Outcome: Progressing     Problem: DISCHARGE PLANNING  Goal: Discharge to home or other facility with appropriate resources  Description: INTERVENTIONS:  - Identify barriers to discharge w/pt and caregiver  - Include patient/family/discharge partner in discharge planning  - Arrange for needed discharge resources and transportation as appropriate  - Identify discharge learning needs (meds, wound care, etc)  - Arrange for interpreters to assist at discharge as needed  - Consider post-discharge preferences of patient/family/discharge partner  - Complete POLST form as appropriate  - Assess patient's ability to be responsible for managing their own health  - Refer to Case Management Department for coordinating discharge planning if the patient needs post-hospital services based on physician/LIP order or complex needs related to functional status, cognitive ability or social support system  Outcome: Progressing     Problem: SKIN/TISSUE INTEGRITY - ADULT  Goal: Incision(s), wounds(s) or drain site(s) healing without S/S of infection  Description: INTERVENTIONS:  - Assess and document risk factors for pressure ulcer development  - Assess and document skin integrity  - Assess and document dressing/incision, wound bed, drain sites and surrounding tissue  - Implement wound care per orders  - Initiate isolation precautions as appropriate  - Initiate Pressure Ulcer prevention bundle as indicated  Outcome: Progressing

## 2022-11-04 ENCOUNTER — TELEPHONE (OUTPATIENT)
Dept: INTERNAL MEDICINE CLINIC | Facility: CLINIC | Age: 32
End: 2022-11-04

## 2022-11-04 DIAGNOSIS — L03.90 RECURRENT CELLULITIS: Primary | ICD-10-CM

## 2022-11-04 NOTE — TELEPHONE ENCOUNTER
Contacted central scheduling spoke to Jeane he is indicating patient does not have ant Dexa scan orders, nor does she had appointment for Ct of the chest.

## 2022-11-04 NOTE — TELEPHONE ENCOUNTER
Kameron Chawla, states she knows nothing about a Deca scan and plans on having a Ct of the chest however not at this time. She was recently at ED and is recovering.

## 2022-11-04 NOTE — TELEPHONE ENCOUNTER
Pt was seen at [address] [place] [address] ED 10/31/2022 admitted for one day. She was diagnosed with cellulitis and lymphedema. Pt is  very concerned about her skin, worries about frequent infections. Reports having a staph infection earlier in the year as well. She was advised at ED she could see infectious disease as they were all puzzled by type of bacteria. Pt is requesting a recommendation to an infectious disease physician.

## 2022-11-09 ENCOUNTER — PATIENT MESSAGE (OUTPATIENT)
Dept: FAMILY MEDICINE CLINIC | Facility: CLINIC | Age: 32
End: 2022-11-09

## 2022-11-09 NOTE — TELEPHONE ENCOUNTER
Ac Bowie RN 11/9/2022 2:46 PM CST        ----- Message -----  From: Carolyn Forde  Sent: 11/9/2022 10:10 AM CST  To: Em Rn Triage  Subject: ID referral     Thank you so much. I have made an appointment and hopeful he can provide some answers.

## 2022-11-14 ENCOUNTER — OFFICE VISIT (OUTPATIENT)
Dept: SURGERY | Facility: CLINIC | Age: 32
End: 2022-11-14
Payer: COMMERCIAL

## 2022-11-14 DIAGNOSIS — L03.311 CELLULITIS, ABDOMINAL WALL: ICD-10-CM

## 2022-11-14 DIAGNOSIS — K43.9 VENTRAL HERNIA WITHOUT OBSTRUCTION OR GANGRENE: Primary | ICD-10-CM

## 2022-11-14 PROCEDURE — 99244 OFF/OP CNSLTJ NEW/EST MOD 40: CPT | Performed by: SURGERY

## 2022-11-22 ENCOUNTER — TELEPHONE (OUTPATIENT)
Dept: FAMILY MEDICINE CLINIC | Facility: CLINIC | Age: 32
End: 2022-11-22

## 2022-11-22 NOTE — TELEPHONE ENCOUNTER
LVM to pt. Pt was advised that as of 1/1/23 we will no longer be accepting [employer] insurance.  Pt was advised to call insurance and inquire on who will be accepting her plan or what plan [facility] will be accepting

## 2022-11-28 ENCOUNTER — OFFICE VISIT (OUTPATIENT)
Dept: FAMILY MEDICINE CLINIC | Facility: CLINIC | Age: 32
End: 2022-11-28
Payer: COMMERCIAL

## 2022-11-28 VITALS
SYSTOLIC BLOOD PRESSURE: 136 MMHG | RESPIRATION RATE: 18 BRPM | DIASTOLIC BLOOD PRESSURE: 81 MMHG | WEIGHT: 293 LBS | HEART RATE: 79 BPM | BODY MASS INDEX: 44.41 KG/M2 | HEIGHT: 68 IN

## 2022-11-28 DIAGNOSIS — F41.9 ANXIETY: ICD-10-CM

## 2022-11-28 DIAGNOSIS — L03.316 CELLULITIS, UMBILICAL: ICD-10-CM

## 2022-11-28 DIAGNOSIS — E66.01 OBESITY, MORBID, BMI 50 OR HIGHER (HCC): ICD-10-CM

## 2022-11-28 DIAGNOSIS — R23.8 SKIN BREAKDOWN: Primary | ICD-10-CM

## 2022-11-28 PROCEDURE — 3008F BODY MASS INDEX DOCD: CPT | Performed by: STUDENT IN AN ORGANIZED HEALTH CARE EDUCATION/TRAINING PROGRAM

## 2022-11-28 PROCEDURE — 3075F SYST BP GE 130 - 139MM HG: CPT | Performed by: STUDENT IN AN ORGANIZED HEALTH CARE EDUCATION/TRAINING PROGRAM

## 2022-11-28 PROCEDURE — 3079F DIAST BP 80-89 MM HG: CPT | Performed by: STUDENT IN AN ORGANIZED HEALTH CARE EDUCATION/TRAINING PROGRAM

## 2022-11-28 PROCEDURE — 99213 OFFICE O/P EST LOW 20 MIN: CPT | Performed by: STUDENT IN AN ORGANIZED HEALTH CARE EDUCATION/TRAINING PROGRAM

## 2022-12-12 ENCOUNTER — NURSE TRIAGE (OUTPATIENT)
Dept: FAMILY MEDICINE CLINIC | Facility: CLINIC | Age: 32
End: 2022-12-12

## 2022-12-12 ENCOUNTER — LAB ENCOUNTER (OUTPATIENT)
Dept: LAB | Age: 32
End: 2022-12-12
Attending: PHYSICIAN ASSISTANT
Payer: COMMERCIAL

## 2022-12-12 ENCOUNTER — OFFICE VISIT (OUTPATIENT)
Dept: INTERNAL MEDICINE CLINIC | Facility: CLINIC | Age: 32
End: 2022-12-12
Payer: COMMERCIAL

## 2022-12-12 VITALS
SYSTOLIC BLOOD PRESSURE: 143 MMHG | DIASTOLIC BLOOD PRESSURE: 79 MMHG | BODY MASS INDEX: 45.99 KG/M2 | TEMPERATURE: 99 F | WEIGHT: 293 LBS | HEIGHT: 67 IN | HEART RATE: 84 BPM

## 2022-12-12 DIAGNOSIS — L03.316 CELLULITIS OF UMBILICUS: ICD-10-CM

## 2022-12-12 DIAGNOSIS — L03.316 CELLULITIS OF UMBILICUS: Primary | ICD-10-CM

## 2022-12-12 LAB
BASOPHILS # BLD AUTO: 0.04 X10(3) UL (ref 0–0.2)
BASOPHILS NFR BLD AUTO: 0.6 %
DEPRECATED RDW RBC AUTO: 42.1 FL (ref 35.1–46.3)
EOSINOPHIL # BLD AUTO: 0.06 X10(3) UL (ref 0–0.7)
EOSINOPHIL NFR BLD AUTO: 0.9 %
ERYTHROCYTE [DISTWIDTH] IN BLOOD BY AUTOMATED COUNT: 12.8 % (ref 11–15)
HCT VFR BLD AUTO: 42.5 %
HGB BLD-MCNC: 14 G/DL
IMM GRANULOCYTES # BLD AUTO: 0.01 X10(3) UL (ref 0–1)
IMM GRANULOCYTES NFR BLD: 0.1 %
LYMPHOCYTES # BLD AUTO: 2.55 X10(3) UL (ref 1–4)
LYMPHOCYTES NFR BLD AUTO: 36.6 %
MCH RBC QN AUTO: 29.4 PG (ref 26–34)
MCHC RBC AUTO-ENTMCNC: 32.9 G/DL (ref 31–37)
MCV RBC AUTO: 89.3 FL
MONOCYTES # BLD AUTO: 0.42 X10(3) UL (ref 0.1–1)
MONOCYTES NFR BLD AUTO: 6 %
NEUTROPHILS # BLD AUTO: 3.89 X10 (3) UL (ref 1.5–7.7)
NEUTROPHILS # BLD AUTO: 3.89 X10(3) UL (ref 1.5–7.7)
NEUTROPHILS NFR BLD AUTO: 55.8 %
PLATELET # BLD AUTO: 327 10(3)UL (ref 150–450)
RBC # BLD AUTO: 4.76 X10(6)UL
WBC # BLD AUTO: 7 X10(3) UL (ref 4–11)

## 2022-12-12 PROCEDURE — 36415 COLL VENOUS BLD VENIPUNCTURE: CPT

## 2022-12-12 PROCEDURE — 3008F BODY MASS INDEX DOCD: CPT | Performed by: PHYSICIAN ASSISTANT

## 2022-12-12 PROCEDURE — 85025 COMPLETE CBC W/AUTO DIFF WBC: CPT

## 2022-12-12 PROCEDURE — 99203 OFFICE O/P NEW LOW 30 MIN: CPT | Performed by: PHYSICIAN ASSISTANT

## 2022-12-12 PROCEDURE — 3077F SYST BP >= 140 MM HG: CPT | Performed by: PHYSICIAN ASSISTANT

## 2022-12-12 PROCEDURE — 3078F DIAST BP <80 MM HG: CPT | Performed by: PHYSICIAN ASSISTANT

## 2022-12-12 NOTE — TELEPHONE ENCOUNTER
Spoke to patient (name and  of patient verified). She reports history of cellulitis in her bellybutton/abdomen. Patient reports her infection seemed to have cleared, but she had shooting pain in her navel last night when she was sitting and then this morning she had yellow discharge when cleaning the area. Patient reports abdominal pain 0/10 now 4/10 when cleaning her umbilicus, temperature: 28.7. Patient recently noticed bloody discharge from her navel and is worried the infection has returned. Patient reports she does not want the infection to get out of control again and would like to be seen.  No Family Medicine appointments available, Internal Medicine appointment scheduled:  Future Appointments   Date Time Provider Jorge Childs   2022  3:20 PM Rachel Simmons PA-C [facility]

## 2023-02-06 ENCOUNTER — LAB ENCOUNTER (OUTPATIENT)
Dept: LAB | Age: 33
End: 2023-02-06
Attending: STUDENT IN AN ORGANIZED HEALTH CARE EDUCATION/TRAINING PROGRAM
Payer: COMMERCIAL

## 2023-02-06 ENCOUNTER — OFFICE VISIT (OUTPATIENT)
Dept: FAMILY MEDICINE CLINIC | Facility: CLINIC | Age: 33
End: 2023-02-06

## 2023-02-06 VITALS
BODY MASS INDEX: 45.99 KG/M2 | SYSTOLIC BLOOD PRESSURE: 133 MMHG | HEART RATE: 77 BPM | WEIGHT: 293 LBS | HEIGHT: 67 IN | DIASTOLIC BLOOD PRESSURE: 76 MMHG

## 2023-02-06 DIAGNOSIS — R06.02 SHORTNESS OF BREATH: ICD-10-CM

## 2023-02-06 DIAGNOSIS — R10.33 PERIUMBILICAL PAIN: ICD-10-CM

## 2023-02-06 DIAGNOSIS — R23.8 SKIN BREAKDOWN: ICD-10-CM

## 2023-02-06 DIAGNOSIS — Z00.00 WELL ADULT EXAM: ICD-10-CM

## 2023-02-06 DIAGNOSIS — Z00.00 WELL ADULT EXAM: Primary | ICD-10-CM

## 2023-02-06 DIAGNOSIS — E66.01 OBESITY, MORBID, BMI 50 OR HIGHER (HCC): ICD-10-CM

## 2023-02-06 DIAGNOSIS — R91.1 INCIDENTAL LUNG NODULE: ICD-10-CM

## 2023-02-06 LAB
ALBUMIN SERPL-MCNC: 3.5 G/DL (ref 3.4–5)
ALBUMIN/GLOB SERPL: 1 {RATIO} (ref 1–2)
ALP LIVER SERPL-CCNC: 71 U/L
ALT SERPL-CCNC: 21 U/L
ANION GAP SERPL CALC-SCNC: 3 MMOL/L (ref 0–18)
AST SERPL-CCNC: 10 U/L (ref 15–37)
BILIRUB SERPL-MCNC: 0.5 MG/DL (ref 0.1–2)
BILIRUB UR QL: NEGATIVE
BUN BLD-MCNC: 9 MG/DL (ref 7–18)
BUN/CREAT SERPL: 14.3 (ref 10–20)
CALCIUM BLD-MCNC: 8.9 MG/DL (ref 8.5–10.1)
CHLORIDE SERPL-SCNC: 110 MMOL/L (ref 98–112)
CHOLEST SERPL-MCNC: 121 MG/DL (ref ?–200)
CO2 SERPL-SCNC: 27 MMOL/L (ref 21–32)
COLOR UR: YELLOW
CREAT BLD-MCNC: 0.63 MG/DL
DEPRECATED RDW RBC AUTO: 42.5 FL (ref 35.1–46.3)
ERYTHROCYTE [DISTWIDTH] IN BLOOD BY AUTOMATED COUNT: 12.9 % (ref 11–15)
EST. AVERAGE GLUCOSE BLD GHB EST-MCNC: 108 MG/DL (ref 68–126)
FASTING PATIENT LIPID ANSWER: YES
FASTING STATUS PATIENT QL REPORTED: YES
GFR SERPLBLD BASED ON 1.73 SQ M-ARVRAT: 122 ML/MIN/1.73M2 (ref 60–?)
GLOBULIN PLAS-MCNC: 3.4 G/DL (ref 2.8–4.4)
GLUCOSE BLD-MCNC: 91 MG/DL (ref 70–99)
GLUCOSE UR-MCNC: NEGATIVE MG/DL
HBA1C MFR BLD: 5.4 % (ref ?–5.7)
HCT VFR BLD AUTO: 43.6 %
HDLC SERPL-MCNC: 51 MG/DL (ref 40–59)
HGB BLD-MCNC: 14.2 G/DL
HGB UR QL STRIP.AUTO: NEGATIVE
KETONES UR-MCNC: NEGATIVE MG/DL
LDLC SERPL CALC-MCNC: 51 MG/DL (ref ?–100)
LEUKOCYTE ESTERASE UR QL STRIP.AUTO: NEGATIVE
MCH RBC QN AUTO: 29.5 PG (ref 26–34)
MCHC RBC AUTO-ENTMCNC: 32.6 G/DL (ref 31–37)
MCV RBC AUTO: 90.6 FL
NITRITE UR QL STRIP.AUTO: NEGATIVE
NONHDLC SERPL-MCNC: 70 MG/DL (ref ?–130)
OSMOLALITY SERPL CALC.SUM OF ELEC: 288 MOSM/KG (ref 275–295)
PH UR: 7 [PH] (ref 5–8)
PLATELET # BLD AUTO: 290 10(3)UL (ref 150–450)
POTASSIUM SERPL-SCNC: 4.1 MMOL/L (ref 3.5–5.1)
PROT SERPL-MCNC: 6.9 G/DL (ref 6.4–8.2)
PROT UR-MCNC: NEGATIVE MG/DL
RBC # BLD AUTO: 4.81 X10(6)UL
SODIUM SERPL-SCNC: 140 MMOL/L (ref 136–145)
SP GR UR STRIP: 1.02 (ref 1–1.03)
TRIGL SERPL-MCNC: 102 MG/DL (ref 30–149)
TSI SER-ACNC: 1.3 MIU/ML (ref 0.36–3.74)
UROBILINOGEN UR STRIP-ACNC: 0.2
VIT D+METAB SERPL-MCNC: 23.2 NG/ML (ref 30–100)
VLDLC SERPL CALC-MCNC: 15 MG/DL (ref 0–30)
WBC # BLD AUTO: 5.8 X10(3) UL (ref 4–11)

## 2023-02-06 PROCEDURE — 3008F BODY MASS INDEX DOCD: CPT | Performed by: STUDENT IN AN ORGANIZED HEALTH CARE EDUCATION/TRAINING PROGRAM

## 2023-02-06 PROCEDURE — 80053 COMPREHEN METABOLIC PANEL: CPT

## 2023-02-06 PROCEDURE — 84443 ASSAY THYROID STIM HORMONE: CPT

## 2023-02-06 PROCEDURE — 36415 COLL VENOUS BLD VENIPUNCTURE: CPT

## 2023-02-06 PROCEDURE — 80061 LIPID PANEL: CPT

## 2023-02-06 PROCEDURE — 3075F SYST BP GE 130 - 139MM HG: CPT | Performed by: STUDENT IN AN ORGANIZED HEALTH CARE EDUCATION/TRAINING PROGRAM

## 2023-02-06 PROCEDURE — 81015 MICROSCOPIC EXAM OF URINE: CPT

## 2023-02-06 PROCEDURE — 99395 PREV VISIT EST AGE 18-39: CPT | Performed by: STUDENT IN AN ORGANIZED HEALTH CARE EDUCATION/TRAINING PROGRAM

## 2023-02-06 PROCEDURE — 82306 VITAMIN D 25 HYDROXY: CPT

## 2023-02-06 PROCEDURE — 3078F DIAST BP <80 MM HG: CPT | Performed by: STUDENT IN AN ORGANIZED HEALTH CARE EDUCATION/TRAINING PROGRAM

## 2023-02-06 PROCEDURE — 81001 URINALYSIS AUTO W/SCOPE: CPT

## 2023-02-06 PROCEDURE — 83036 HEMOGLOBIN GLYCOSYLATED A1C: CPT

## 2023-02-06 PROCEDURE — 85027 COMPLETE CBC AUTOMATED: CPT

## 2023-02-06 RX ORDER — ALBUTEROL SULFATE 90 UG/1
2 AEROSOL, METERED RESPIRATORY (INHALATION) EVERY 4 HOURS PRN
Qty: 1 EACH | Refills: 3 | Status: SHIPPED | OUTPATIENT
Start: 2023-02-06 | End: 2024-02-06

## 2023-03-13 ENCOUNTER — HOSPITAL ENCOUNTER (OUTPATIENT)
Dept: CT IMAGING | Facility: HOSPITAL | Age: 33
Discharge: HOME | End: 2023-03-13
Attending: STUDENT IN AN ORGANIZED HEALTH CARE EDUCATION/TRAINING PROGRAM
Payer: COMMERCIAL

## 2023-03-13 DIAGNOSIS — R91.1 INCIDENTAL LUNG NODULE: ICD-10-CM

## 2023-03-13 PROCEDURE — 71250 CT THORAX DX C-: CPT | Performed by: STUDENT IN AN ORGANIZED HEALTH CARE EDUCATION/TRAINING PROGRAM

## 2023-03-17 ENCOUNTER — PATIENT MESSAGE (OUTPATIENT)
Dept: DERMATOLOGY CLINIC | Facility: CLINIC | Age: 33
End: 2023-03-17

## 2023-03-20 NOTE — TELEPHONE ENCOUNTER
Per Jose Angel Chou from [facility], please send pt's rx for [address], along with chart notes and new insurance information for the PA renewal.  Rx, chart notes, and insurance information faxed to Jose Angel Chou at [facility] on 3/20/23. Awaiting determination.

## 2023-03-20 NOTE — TELEPHONE ENCOUNTER
LOV 8/2022 Yolanda Ashvin - pt is due for next injection for Dupixent in a few days. Brigido Holter will need rx and new insurance information for get started on a new PA for pt. Ok to send rx to Brigido Holter? Thank you.

## 2023-03-20 NOTE — TELEPHONE ENCOUNTER
E-mail sent to Abner at [place] [facility] confirming that patient has update insurance. Awaiting response from Abner on next steps for pt to receive Dupixent.

## 2023-04-03 RX ORDER — DUPILUMAB 300 MG/2ML
300 INJECTION, SOLUTION SUBCUTANEOUS
Qty: 4 ML | Refills: 5 | Status: SHIPPED | OUTPATIENT
Start: 2023-04-03 | End: 2023-05-01

## 2023-04-03 NOTE — TELEPHONE ENCOUNTER
Pt called. Tomer Arrieta is no longer filled Dupixent due to insurance coverage. Will be covered by [employer]. Escript transferred to [place]. Pt had concerns of gap between doses, informed pt to call if noticing any new side effects. Pt verbalized understanding.

## 2023-04-13 ENCOUNTER — TELEPHONE (OUTPATIENT)
Dept: DERMATOLOGY CLINIC | Facility: CLINIC | Age: 33
End: 2023-04-13

## 2023-09-24 ENCOUNTER — PATIENT MESSAGE (OUTPATIENT)
Dept: FAMILY MEDICINE CLINIC | Facility: CLINIC | Age: 33
End: 2023-09-24

## 2023-09-25 ENCOUNTER — NURSE TRIAGE (OUTPATIENT)
Dept: FAMILY MEDICINE CLINIC | Facility: CLINIC | Age: 33
End: 2023-09-25

## 2023-09-25 NOTE — TELEPHONE ENCOUNTER
Patient booked an appt via [facility] for the following:    Abdomen pain near belly button again

## 2023-09-26 NOTE — TELEPHONE ENCOUNTER
Patient called back and wishes to move her appt up to 9/27. Appointment changed per patient request.  IC/ER flags discussed.

## 2023-09-26 NOTE — TELEPHONE ENCOUNTER
Action Requested: Summary for Provider     []  Critical Lab, Recommendations Needed  [] Need Additional Advice  [x]   FYI    []   Need Orders  [] Need Medications Sent to Pharmacy  []  Other     SUMMARY: Spoke to patient who states last year had umbilical infection and was diagnosed with small umbilical hernia. Patient states on Sunday she was out of town and started having \"lightening rich \"pain in umbilical area. Patient states she is doing better today. Patient denies vomiting,fever, severe pain,or redness. No pain today. Patient scheduled for a sooner appointment. Patient advised to go to ER if any,fever, redness, swelling, severe pain or vomiting. Patient verbalized understanding.         Reason for call: Umbilical Hernia  Onset: 2 days     Reason for Disposition   Previously diagnosed hernia    Protocols used: Hernia-A-[place]

## 2023-09-26 NOTE — TELEPHONE ENCOUNTER
Patient called back and wanted to see if appointments were still available for this afternoon. At this time they are taken. Patient declined appointment tomorrow and will keep as scheduled     Future Appointments   Date Time Provider Jorge Childs   9/29/2023  8:45 AM JARRETT [facility]   10/9/2023 12:45 PM Randal Saul MD [address] [place] [address]   10/16/2023 12:15 PM Jillian Osler, MD [facility]     Reviewed emergency symptoms and when patient should be seen sooner in ER/ICC.

## 2023-09-27 ENCOUNTER — OFFICE VISIT (OUTPATIENT)
Dept: FAMILY MEDICINE CLINIC | Facility: CLINIC | Age: 33
End: 2023-09-27

## 2023-09-27 VITALS
DIASTOLIC BLOOD PRESSURE: 82 MMHG | RESPIRATION RATE: 20 BRPM | OXYGEN SATURATION: 98 % | TEMPERATURE: 99 F | WEIGHT: 293 LBS | HEIGHT: 67 IN | SYSTOLIC BLOOD PRESSURE: 148 MMHG | HEART RATE: 96 BPM | BODY MASS INDEX: 45.99 KG/M2

## 2023-09-27 DIAGNOSIS — R10.33 UMBILICAL PAIN: Primary | ICD-10-CM

## 2023-09-27 DIAGNOSIS — R35.0 URINARY FREQUENCY: ICD-10-CM

## 2023-09-27 DIAGNOSIS — R50.9 LOW GRADE FEVER: ICD-10-CM

## 2023-09-27 LAB
APPEARANCE: CLEAR
BILIRUBIN: NEGATIVE
GLUCOSE (URINE DIPSTICK): NEGATIVE MG/DL
LEUKOCYTES: NEGATIVE
MULTISTIX LOT#: ABNORMAL NUMERIC
NITRITE, URINE: NEGATIVE
PH, URINE: 6 (ref 4.5–8)
SPECIFIC GRAVITY: 1 (ref 1–1.03)
URINE-COLOR: YELLOW
UROBILINOGEN,SEMI-QN: 0.2 MG/DL (ref 0–1.9)

## 2023-09-27 PROCEDURE — 3079F DIAST BP 80-89 MM HG: CPT

## 2023-09-27 PROCEDURE — 3008F BODY MASS INDEX DOCD: CPT

## 2023-09-27 PROCEDURE — 81002 URINALYSIS NONAUTO W/O SCOPE: CPT

## 2023-09-27 PROCEDURE — 99213 OFFICE O/P EST LOW 20 MIN: CPT

## 2023-09-27 PROCEDURE — 3077F SYST BP >= 140 MM HG: CPT

## 2023-09-27 RX ORDER — SULFAMETHOXAZOLE AND TRIMETHOPRIM 800; 160 MG/1; MG/1
1 TABLET ORAL 2 TIMES DAILY
Qty: 6 TABLET | Refills: 0 | Status: SHIPPED | OUTPATIENT
Start: 2023-09-27 | End: 2023-09-30

## 2023-09-29 ENCOUNTER — LAB ENCOUNTER (OUTPATIENT)
Dept: LAB | Age: 33
End: 2023-09-29
Attending: STUDENT IN AN ORGANIZED HEALTH CARE EDUCATION/TRAINING PROGRAM
Payer: COMMERCIAL

## 2023-09-29 ENCOUNTER — TELEPHONE (OUTPATIENT)
Dept: FAMILY MEDICINE CLINIC | Facility: CLINIC | Age: 33
End: 2023-09-29

## 2023-09-29 ENCOUNTER — OFFICE VISIT (OUTPATIENT)
Dept: FAMILY MEDICINE CLINIC | Facility: CLINIC | Age: 33
End: 2023-09-29

## 2023-09-29 VITALS
TEMPERATURE: 97 F | DIASTOLIC BLOOD PRESSURE: 77 MMHG | OXYGEN SATURATION: 98 % | HEIGHT: 67 IN | SYSTOLIC BLOOD PRESSURE: 148 MMHG | HEART RATE: 80 BPM | BODY MASS INDEX: 45.99 KG/M2 | WEIGHT: 293 LBS

## 2023-09-29 DIAGNOSIS — R11.0 NAUSEA: ICD-10-CM

## 2023-09-29 DIAGNOSIS — R10.84 GENERALIZED ABDOMINAL PAIN: ICD-10-CM

## 2023-09-29 LAB
ALBUMIN SERPL-MCNC: 3.5 G/DL (ref 3.4–5)
ALBUMIN/GLOB SERPL: 0.9 {RATIO} (ref 1–2)
ALP LIVER SERPL-CCNC: 66 U/L
ALT SERPL-CCNC: 30 U/L
ANION GAP SERPL CALC-SCNC: 9 MMOL/L (ref 0–18)
AST SERPL-CCNC: 15 U/L (ref 15–37)
BASOPHILS # BLD AUTO: 0.03 X10(3) UL (ref 0–0.2)
BASOPHILS NFR BLD AUTO: 0.5 %
BILIRUB SERPL-MCNC: 0.5 MG/DL (ref 0.1–2)
BUN BLD-MCNC: 11 MG/DL (ref 7–18)
BUN/CREAT SERPL: 15.1 (ref 10–20)
CALCIUM BLD-MCNC: 9 MG/DL (ref 8.5–10.1)
CHLORIDE SERPL-SCNC: 109 MMOL/L (ref 98–112)
CO2 SERPL-SCNC: 23 MMOL/L (ref 21–32)
CREAT BLD-MCNC: 0.73 MG/DL
CRP SERPL-MCNC: 0.54 MG/DL (ref ?–0.3)
DEPRECATED RDW RBC AUTO: 42 FL (ref 35.1–46.3)
EGFRCR SERPLBLD CKD-EPI 2021: 113 ML/MIN/1.73M2 (ref 60–?)
EOSINOPHIL # BLD AUTO: 0.05 X10(3) UL (ref 0–0.7)
EOSINOPHIL NFR BLD AUTO: 0.8 %
ERYTHROCYTE [DISTWIDTH] IN BLOOD BY AUTOMATED COUNT: 12.9 % (ref 11–15)
EST. AVERAGE GLUCOSE BLD GHB EST-MCNC: 105 MG/DL (ref 68–126)
FASTING STATUS PATIENT QL REPORTED: NO
GLOBULIN PLAS-MCNC: 3.7 G/DL (ref 2.8–4.4)
GLUCOSE BLD-MCNC: 86 MG/DL (ref 70–99)
HBA1C MFR BLD: 5.3 % (ref ?–5.7)
HCT VFR BLD AUTO: 42.2 %
HGB BLD-MCNC: 14 G/DL
IMM GRANULOCYTES # BLD AUTO: 0.01 X10(3) UL (ref 0–1)
IMM GRANULOCYTES NFR BLD: 0.2 %
LIPASE SERPL-CCNC: 21 U/L (ref 13–75)
LYMPHOCYTES # BLD AUTO: 2.07 X10(3) UL (ref 1–4)
LYMPHOCYTES NFR BLD AUTO: 32.8 %
MCH RBC QN AUTO: 29.4 PG (ref 26–34)
MCHC RBC AUTO-ENTMCNC: 33.2 G/DL (ref 31–37)
MCV RBC AUTO: 88.5 FL
MONOCYTES # BLD AUTO: 0.43 X10(3) UL (ref 0.1–1)
MONOCYTES NFR BLD AUTO: 6.8 %
NEUTROPHILS # BLD AUTO: 3.72 X10 (3) UL (ref 1.5–7.7)
NEUTROPHILS # BLD AUTO: 3.72 X10(3) UL (ref 1.5–7.7)
NEUTROPHILS NFR BLD AUTO: 58.9 %
OSMOLALITY SERPL CALC.SUM OF ELEC: 291 MOSM/KG (ref 275–295)
PLATELET # BLD AUTO: 301 10(3)UL (ref 150–450)
POTASSIUM SERPL-SCNC: 4 MMOL/L (ref 3.5–5.1)
PROT SERPL-MCNC: 7.2 G/DL (ref 6.4–8.2)
RBC # BLD AUTO: 4.77 X10(6)UL
SODIUM SERPL-SCNC: 141 MMOL/L (ref 136–145)
TSI SER-ACNC: 1.71 MIU/ML (ref 0.36–3.74)
WBC # BLD AUTO: 6.3 X10(3) UL (ref 4–11)

## 2023-09-29 PROCEDURE — 3008F BODY MASS INDEX DOCD: CPT | Performed by: STUDENT IN AN ORGANIZED HEALTH CARE EDUCATION/TRAINING PROGRAM

## 2023-09-29 PROCEDURE — 86140 C-REACTIVE PROTEIN: CPT

## 2023-09-29 PROCEDURE — 99214 OFFICE O/P EST MOD 30 MIN: CPT | Performed by: STUDENT IN AN ORGANIZED HEALTH CARE EDUCATION/TRAINING PROGRAM

## 2023-09-29 PROCEDURE — 80053 COMPREHEN METABOLIC PANEL: CPT

## 2023-09-29 PROCEDURE — 83036 HEMOGLOBIN GLYCOSYLATED A1C: CPT

## 2023-09-29 PROCEDURE — 83013 H PYLORI (C-13) BREATH: CPT | Performed by: STUDENT IN AN ORGANIZED HEALTH CARE EDUCATION/TRAINING PROGRAM

## 2023-09-29 PROCEDURE — 84443 ASSAY THYROID STIM HORMONE: CPT

## 2023-09-29 PROCEDURE — 3078F DIAST BP <80 MM HG: CPT | Performed by: STUDENT IN AN ORGANIZED HEALTH CARE EDUCATION/TRAINING PROGRAM

## 2023-09-29 PROCEDURE — 85025 COMPLETE CBC W/AUTO DIFF WBC: CPT

## 2023-09-29 PROCEDURE — 36415 COLL VENOUS BLD VENIPUNCTURE: CPT

## 2023-09-29 PROCEDURE — 83690 ASSAY OF LIPASE: CPT

## 2023-09-29 PROCEDURE — 3077F SYST BP >= 140 MM HG: CPT | Performed by: STUDENT IN AN ORGANIZED HEALTH CARE EDUCATION/TRAINING PROGRAM

## 2023-09-29 RX ORDER — AMOXICILLIN AND CLAVULANATE POTASSIUM 875; 125 MG/1; MG/1
1 TABLET, FILM COATED ORAL 2 TIMES DAILY
Qty: 20 TABLET | Refills: 0 | Status: SHIPPED | OUTPATIENT
Start: 2023-09-29 | End: 2023-10-09

## 2023-09-29 RX ORDER — ONDANSETRON HYDROCHLORIDE 8 MG/1
8 TABLET, FILM COATED ORAL EVERY 8 HOURS PRN
Qty: 30 TABLET | Refills: 3 | Status: SHIPPED | OUTPATIENT
Start: 2023-09-29

## 2023-09-29 NOTE — TELEPHONE ENCOUNTER
Spoke to patient. She said that she is on the last day of antibiotics for UTI. She said she woke up in the middle of the night with chills (no fever) and very lethargic. She is still urinating every few minutes and has pelvic pressure. There was an opening with Dr. Mat Velez this morning so she took it. Future Appointments   Date Time Provider Jorge Childs   9/29/2023  9:45 AM Alessia Sneed MD [facility]   10/9/2023 12:45 PM Donte Jones MD [facility]   10/16/2023 12:15 PM Alessia Sneed MD [facility]     Urine culture showed probably contamination. Rn did ask her to check a Covid test for chills/lethargy symptoms. She was agreeable.

## 2023-09-30 LAB — H PYLORI BREATH TEST: NEGATIVE

## 2023-10-02 ENCOUNTER — LAB ENCOUNTER (OUTPATIENT)
Dept: LAB | Age: 33
End: 2023-10-02
Attending: STUDENT IN AN ORGANIZED HEALTH CARE EDUCATION/TRAINING PROGRAM
Payer: COMMERCIAL

## 2023-10-02 ENCOUNTER — TELEPHONE (OUTPATIENT)
Dept: FAMILY MEDICINE CLINIC | Facility: CLINIC | Age: 33
End: 2023-10-02

## 2023-10-02 ENCOUNTER — TELEPHONE (OUTPATIENT)
Dept: DERMATOLOGY CLINIC | Facility: CLINIC | Age: 33
End: 2023-10-02

## 2023-10-02 DIAGNOSIS — R10.84 GENERALIZED ABDOMINAL PAIN: Primary | ICD-10-CM

## 2023-10-02 DIAGNOSIS — R10.84 GENERALIZED ABDOMINAL PAIN: ICD-10-CM

## 2023-10-02 PROCEDURE — 87493 C DIFF AMPLIFIED PROBE: CPT

## 2023-10-02 NOTE — TELEPHONE ENCOUNTER
Called and talked to patient. Checked with Dr. Frederick. Ok to continue with dupixent. She expressed understanding.

## 2023-10-02 NOTE — TELEPHONE ENCOUNTER
Spoke to patient. She was calling with an update from her appointments last week. She said her pain has improved but still gets waves of pain. She is still nauseous despite Zofran q 8 hours. Her face is flushing and she feels like she has a fever but does not. She is burping after eating and still feels bloated. She is on her way to drop off stool sample to lab. She wants to know if she should take her Dupixent. She is asking for any further recommendations. Dr. Kasie Tobias, please advise.

## 2023-10-03 DIAGNOSIS — R11.0 NAUSEA: Primary | ICD-10-CM

## 2023-10-03 LAB — C DIFF TOX B STL QL: NEGATIVE

## 2023-10-03 RX ORDER — PANTOPRAZOLE SODIUM 40 MG/1
40 TABLET, DELAYED RELEASE ORAL
Qty: 30 TABLET | Refills: 0 | Status: SHIPPED | OUTPATIENT
Start: 2023-10-03

## 2023-10-03 NOTE — TELEPHONE ENCOUNTER
Spoke with patient, (  Name and  verified ) informed of  Jhon Bi  APRN instructions below    Patient has begun taking the Cephalexin today     Advised to call back in 2-3 days as directed by Dr Carrie Cuevas   (see note below )    Patient verbalizes understanding and agrees with plan.

## 2023-10-03 NOTE — TELEPHONE ENCOUNTER
Patient is calling today very confused on which antibiotic to take. Per patient she submitted a urine sample last week on 9/27/23. She was prescribed    Disp Refills Start End    sulfamethoxazole-trimethoprim DS (BACTRIM DS) 800-160 MG Oral Tab per tablet  3 day supply         By Shahla FARIAS, then on Friday patient discussed her result with Dr. Saskia Arrieta who advised her the urine sample was contaminated and that Dr. Saskia Arrieta didn't think her symptoms were related to a UTI, possible stomach issue/ infection so she prescribed patient amoxicillin clavulanate 875-125 MG Oral Tab 10 day supply. Patient then received a message on 10/2/23 from Shahla FARIAS advising her to  Stop Augmentin, per Dr. Saskia Arrieta. Cephalexin 500 mg twice daily x7 days sent to pharmacy instead. Please call office in 2-3 days with update regarding symptoms. Patient did not start or  Cephalexin. She is asking what exactly should she be doing? And, what is her diagnosis, what medication should she be on. She verbalized, \"I'm getting nervous and worried because this is confusing. I just need to know what I'm supposed to be doing or taking and if I am supposed to start Cephalexin, what is it for? \"      Please clarify and advise.     RN's reach patient at [phone] (she will keep phone with her, ok to LM if she doesn't answer for some reason)

## 2023-10-03 NOTE — TELEPHONE ENCOUNTER
Please let pt know that I'd like her to continue Augmentin since it provides better coverage for her symptoms compared to Keflex. Stool testing negative.   Will send PPI as adjunct to address dyspepsia, but if no improvement within 48hours, will help arrange timely GI eval.

## 2023-10-03 NOTE — TELEPHONE ENCOUNTER
Will defer to Dr. Monse Cummings. Advise patient to continue recommendations given by Dr. Monse Cummings . Dr. Monse Cummings out of office today but will return tomorrow.

## 2023-10-04 NOTE — TELEPHONE ENCOUNTER
Verified name and . Patient calling for clarification on [employer] message she received with Dr. Fish Jenkins message. Patient reports she is confused and anxious. This RN re-informed patient of Dr. Fish Jenkins message and answered questions. Patient had no further questions at this time. She requested that it be noted in the chart that she is taking Dupixent prescribed by another provider and that she is aware that joint pain is a possible side effects and that she has been having ankle pain a couple days after administering that medication.

## 2023-10-09 ENCOUNTER — OFFICE VISIT (OUTPATIENT)
Dept: DERMATOLOGY CLINIC | Facility: CLINIC | Age: 33
End: 2023-10-09

## 2023-10-09 ENCOUNTER — LAB ENCOUNTER (OUTPATIENT)
Dept: LAB | Age: 33
End: 2023-10-09
Attending: STUDENT IN AN ORGANIZED HEALTH CARE EDUCATION/TRAINING PROGRAM
Payer: COMMERCIAL

## 2023-10-09 DIAGNOSIS — D23.9 BENIGN NEOPLASM OF SKIN, UNSPECIFIED LOCATION: ICD-10-CM

## 2023-10-09 DIAGNOSIS — M25.542 JOINT PAIN IN FINGERS OF BOTH HANDS: ICD-10-CM

## 2023-10-09 DIAGNOSIS — R11.0 NAUSEA: ICD-10-CM

## 2023-10-09 DIAGNOSIS — R10.84 GENERALIZED ABDOMINAL PAIN: ICD-10-CM

## 2023-10-09 DIAGNOSIS — L20.89 OTHER ATOPIC DERMATITIS: Primary | ICD-10-CM

## 2023-10-09 DIAGNOSIS — M25.541 JOINT PAIN IN FINGERS OF BOTH HANDS: ICD-10-CM

## 2023-10-09 DIAGNOSIS — Z51.81 MEDICATION MONITORING ENCOUNTER: ICD-10-CM

## 2023-10-09 LAB
IGA SERPL-MCNC: 132 MG/DL (ref 70–312)
RBC #/AREA URNS AUTO: >10 /HPF
SP GR UR REFRACTOMETRY: 1.01 (ref 1–1.03)
WBC #/AREA URNS AUTO: >50 /HPF
WBC CLUMPS UR QL AUTO: PRESENT /HPF

## 2023-10-09 PROCEDURE — 99214 OFFICE O/P EST MOD 30 MIN: CPT | Performed by: DERMATOLOGY

## 2023-10-09 PROCEDURE — 36415 COLL VENOUS BLD VENIPUNCTURE: CPT

## 2023-10-09 PROCEDURE — 82784 ASSAY IGA/IGD/IGG/IGM EACH: CPT

## 2023-10-09 PROCEDURE — 86364 TISS TRNSGLTMNASE EA IG CLAS: CPT

## 2023-10-09 PROCEDURE — 81001 URINALYSIS AUTO W/SCOPE: CPT

## 2023-10-09 RX ORDER — DUPILUMAB 300 MG/2ML
300 INJECTION, SOLUTION SUBCUTANEOUS
Qty: 2 EACH | Refills: 6 | Status: SHIPPED | OUTPATIENT
Start: 2023-10-09

## 2023-10-09 NOTE — TELEPHONE ENCOUNTER
Routed to Dr Talat Ferreira for advise, thanks.   LOV 9-29-23    Future Appointments   Date Time Provider Jorge Childs   10/9/2023 12:45 PM Destiney Trinidad MD [facility]   10/16/2023 12:15 PM Gissell Camacho MD [facility]nn Manuel

## 2023-10-09 NOTE — TELEPHONE ENCOUNTER
Pt stated she have a f/u appt Monday     Stated the medication she thought was helping , urinary frequency had stopped and now returned, pain in abdomen around the belly button

## 2023-10-09 NOTE — TELEPHONE ENCOUNTER
Name and  verified. Pt informed. Verbalized good understanding of all with intent to comply. Future Appointments   Date Time Provider Jorge Sanchezi   10/9/2023 12:45 PM Phil Franco MD [address] [place] [address]   10/16/2023 12:15 PM Dianne Larkin MD [facility]     Patient will complete today.

## 2023-10-10 LAB — TTG IGA SER-ACNC: <0.2 U/ML (ref ?–7)

## 2023-10-11 ENCOUNTER — PATIENT MESSAGE (OUTPATIENT)
Dept: FAMILY MEDICINE CLINIC | Facility: CLINIC | Age: 33
End: 2023-10-11

## 2023-10-16 ENCOUNTER — OFFICE VISIT (OUTPATIENT)
Dept: FAMILY MEDICINE CLINIC | Facility: CLINIC | Age: 33
End: 2023-10-16

## 2023-10-16 ENCOUNTER — APPOINTMENT (OUTPATIENT)
Dept: LAB | Facility: HOSPITAL | Age: 33
End: 2023-10-16
Attending: STUDENT IN AN ORGANIZED HEALTH CARE EDUCATION/TRAINING PROGRAM
Payer: COMMERCIAL

## 2023-10-16 VITALS
DIASTOLIC BLOOD PRESSURE: 77 MMHG | WEIGHT: 293 LBS | SYSTOLIC BLOOD PRESSURE: 123 MMHG | OXYGEN SATURATION: 98 % | HEIGHT: 67 IN | BODY MASS INDEX: 45.99 KG/M2 | TEMPERATURE: 98 F | HEART RATE: 64 BPM

## 2023-10-16 DIAGNOSIS — R35.0 URINARY FREQUENCY: ICD-10-CM

## 2023-10-16 DIAGNOSIS — R10.84 GENERALIZED ABDOMINAL PAIN: Primary | ICD-10-CM

## 2023-10-16 PROCEDURE — 87338 HPYLORI STOOL AG IA: CPT

## 2023-10-16 PROCEDURE — 3008F BODY MASS INDEX DOCD: CPT | Performed by: STUDENT IN AN ORGANIZED HEALTH CARE EDUCATION/TRAINING PROGRAM

## 2023-10-16 PROCEDURE — 99214 OFFICE O/P EST MOD 30 MIN: CPT | Performed by: STUDENT IN AN ORGANIZED HEALTH CARE EDUCATION/TRAINING PROGRAM

## 2023-10-16 PROCEDURE — 3078F DIAST BP <80 MM HG: CPT | Performed by: STUDENT IN AN ORGANIZED HEALTH CARE EDUCATION/TRAINING PROGRAM

## 2023-10-16 PROCEDURE — 3074F SYST BP LT 130 MM HG: CPT | Performed by: STUDENT IN AN ORGANIZED HEALTH CARE EDUCATION/TRAINING PROGRAM

## 2023-10-16 PROCEDURE — 83993 ASSAY FOR CALPROTECTIN FECAL: CPT

## 2023-10-17 ENCOUNTER — LAB ENCOUNTER (OUTPATIENT)
Dept: LAB | Age: 33
End: 2023-10-17
Attending: STUDENT IN AN ORGANIZED HEALTH CARE EDUCATION/TRAINING PROGRAM
Payer: COMMERCIAL

## 2023-10-17 DIAGNOSIS — R35.0 URINARY FREQUENCY: ICD-10-CM

## 2023-10-17 DIAGNOSIS — R10.84 GENERALIZED ABDOMINAL PAIN: ICD-10-CM

## 2023-10-17 LAB
BILIRUB UR QL: NEGATIVE
CLARITY UR: CLEAR
GLUCOSE UR-MCNC: NORMAL MG/DL
HGB UR QL STRIP.AUTO: NEGATIVE
KETONES UR-MCNC: NEGATIVE MG/DL
LEUKOCYTE ESTERASE UR QL STRIP.AUTO: NEGATIVE
NITRITE UR QL STRIP.AUTO: NEGATIVE
PH UR: 5.5 [PH] (ref 5–8)
PROT UR-MCNC: NEGATIVE MG/DL
SP GR UR STRIP: 1.02 (ref 1–1.03)
UROBILINOGEN UR STRIP-ACNC: NORMAL

## 2023-10-17 PROCEDURE — 81003 URINALYSIS AUTO W/O SCOPE: CPT

## 2023-10-19 LAB
CALPROTECTIN STL-MCNT: 11.9 ΜG/G (ref ?–50)
H PYLORI AG STL QL IA: NEGATIVE

## 2023-10-20 ENCOUNTER — PATIENT MESSAGE (OUTPATIENT)
Dept: FAMILY MEDICINE CLINIC | Facility: CLINIC | Age: 33
End: 2023-10-20

## 2023-10-20 NOTE — TELEPHONE ENCOUNTER
From: Faythe Aschoff  To: Teo Mitchell  Sent: 10/20/2023 1:17 PM CDT  Subject: Urinating and referral     Hello, I am reaching out in regards to the referral you have given me. I am scheduled for their soonest which is in January. However my urine frequency has gone up again. For example today at 1pm I have already urinated 11 times with the nurse to urinate the whole time. Is there a different doctor that you would be able to refer me so that I can see if they can possibly get me in sooner. I appreciate your help in advance.      Thank you

## 2023-10-20 NOTE — TELEPHONE ENCOUNTER
Patient was seen in office with Dr. Rhianna Christianson on 10/16/23 for urinary frequency. Please see [email or web address] message below and advise. Thank you.

## 2023-10-22 NOTE — PROGRESS NOTES
Ernesto Dow is a 34year old female. HPI:     CC:  Patient presents with:  Upper Body Exam: LOV 8/15/22. Patient present for Upper Body examination. Declined FBSE as, all of her concerns are on her upper torso. Patient with raised, red and textures lesion on her nose for 2 months. Notes, it may be from the nose pad on here glasses. Denies personal Hx of skin cancer. Has family Hx of melanoma(Maternal family). Allergies:  Patient has no known allergies. HISTORY:    Past Medical History:   Diagnosis Date    Cellulitis 2022      Past Surgical History:   Procedure Laterality Date    TONSILLECTOMY Bilateral 01/01/2013      Family History   Problem Relation Age of Onset    Breast Cancer Mother         s/p b/l mastectomy, neg BRCA    Cancer Maternal Grandmother         lung    Cancer Paternal Grandmother         ovarian, uterine      Social History     Socioeconomic History    Marital status: Life Partner   Tobacco Use    Smoking status: Never    Smokeless tobacco: Never   Vaping Use    Vaping Use: Never used   Substance and Sexual Activity    Alcohol use: Not Currently    Drug use: Yes     Types: Cannabis   Other Topics Concern    Reaction to local anesthetic No        Current Outpatient Medications   Medication Sig Dispense Refill    Dupilumab (DUPIXENT) 300 MG/2ML Subcutaneous Solution Pen-injector Inject 300 mg into the skin every 14 (fourteen) days. 2 each 6    pantoprazole 40 MG Oral Tab EC Take 1 tablet (40 mg total) by mouth every morning before breakfast. 30 tablet 0    ondansetron (ZOFRAN) 8 MG tablet Take 1 tablet (8 mg total) by mouth every 8 (eight) hours as needed for Nausea. 30 tablet 3    albuterol 108 (90 Base) MCG/ACT Inhalation Aero Soln Inhale 2 puffs into the lungs every 4 (four) hours as needed for Wheezing or Shortness of Breath (chest tightness).  1 each 3    ERGOCALCIFEROL 1.25 MG (27441 UT) Oral Cap TAKE 1 CAPSULE BY MOUTH ONE TIME PER WEEK 12 capsule 1     Allergies:   No Known Allergies    Past Medical History:   Diagnosis Date    Cellulitis 2022     Past Surgical History:   Procedure Laterality Date    TONSILLECTOMY Bilateral 01/01/2013     Social History    Socioeconomic History      Marital status: Life Partner      Spouse name: Not on file      Number of children: Not on file      Years of education: Not on file      Highest education level: Not on file    Occupational History      Not on file    Tobacco Use      Smoking status: Never      Smokeless tobacco: Never    Vaping Use      Vaping Use: Never used    Substance and Sexual Activity      Alcohol use: Not Currently      Drug use: Yes        Types: Cannabis      Sexual activity: Not on file    Other Topics      Concerns:        Grew up on a farm: Not Asked        History of tanning: Not Asked        Outdoor occupation: Not Asked        Breast feeding: Not Asked        Reaction to local anesthetic: No    Social History Narrative      Not on file    Social Determinants of Health  Financial Resource Strain: Not on file  Food Insecurity: Not on file  Transportation Needs: Not on file  Physical Activity: Not on file  Stress: Not on file  Social Connections: Not on file  Housing Stability: Not on file  Family History   Problem Relation Age of Onset    Breast Cancer Mother         s/p b/l mastectomy, neg BRCA    Cancer Maternal Grandmother         lung    Cancer Paternal Grandmother         ovarian, uterine       There were no vitals filed for this visit. HPI:  Patient presents with:  Upper Body Exam: LOV 8/15/22. Patient present for Upper Body examination. Declined FBSE as, all of her concerns are on her upper torso. Patient with raised, red and textures lesion on her nose for 2 months. Notes, it may be from the nose pad on here glasses. Denies personal Hx of skin cancer. Has family Hx of melanoma(Maternal family). Follow-up atopic dermatitis on Dupixent every other week. Has been doing well.   Patient with family history of melanoma in maternal family members. Patient concern regarding several lesions no personal history of skin cancer,    Dupixent has been working well rare flareups on hands minimal peeling at times when due for injection. Joint pain continues to come and go. Does have osteoarthritis. Patient presents with concerns above. Patient has been in their usual state of health. Past notes/ records and appropriate/relevant lab results including pathology and past body maps reviewed. Including outside notes/ PCP notes as appropriate. Updated and new information noted in current visit. ROS:  Denies other relevant systemic complaints. History, medications, allergies reviewed as noted. Physical Examination:     Well-developed well-nourished patient alert oriented in no acute distress. Exam performed, including scalp, head, neck, face,nails, hair, external eyes, including conjunctival mucosa, eyelids, lips external ears , arms, digits,palms. Multiple light to medium brown, well marginated, uniformly pigmented, macules and papules 6 mm and less scattered on exam. pigmented lesions examined with dermoscopy benign-appearing patterns. Waxy tannish keratotic papules scattered, cherry-red vascular papules scattered. See map today's date for lesions noted . See assessment and plan below for specific lesions. Otherwise remarkable for lesions as noted on map. See A/P  below for additional information:    Assessment / plan:    No orders of the defined types were placed in this encounter. Meds & Refills for this Visit:  Requested Prescriptions     Signed Prescriptions Disp Refills    Dupilumab (DUPIXENT) 300 MG/2ML Subcutaneous Solution Pen-injector 2 each 6     Sig: Inject 300 mg into the skin every 14 (fourteen) days.          Other atopic dermatitis  (primary encounter diagnosis)  Medication monitoring encounter  Joint pain in fingers of both hands  Benign neoplasm of skin, unspecified location      Waxy tan papule at right nasal sidewall irritated by glasses trial cryo reassurance. Scattered lentigines nevi keratoses to her angiomas reassurance  Benign skin lesions    Joint pain follow-up with rheumatology osteoarthritis, other overuse syndromes. In general skin has been doing well no evidence of psoriasis    Chronic atopic dermatitis well-controlled on Dupixent  Does have some peeling prior to time she is due for her injections otherwise clears between minimal dryness. 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 Has really not had any significant issues has not had to take time off work as she had prior to starting Dupixent  Overall nothing new or different    Superficial erythema minimal scale at fingertip, right third lateral digit  Otherwise completely clear on Dupixent  IgA presently 1 versus IgA 4 prior to [address]    Continue careful skin care moisturizers. Discussed newer options available. Overall has been doing pretty well. We will plan follow-up in 4 to 6 months    Lesion on nasal tip dome-shaped papule likely fibrous papule. Observe. Discussed possible removal likely to leave scar, divot. Other benign-appearing nevi. No other susupicious lesions on todays  exam.      Please refer to map for specific lesions. See additional diagnoses. Pros cons of various therapies, risks benefits discussed. Pathophysiology discussed with patient. Therapeutic options reviewed. See  Medications in grid. Instructions reviewed at length. Benign nevi, seborrheic  keratoses, cherry angiomas:  Reassurance regarding other benign skin lesions. Signs and symptoms of skin cancer, ABCDE's of melanoma discussed with patient. Sunscreen use, sun protection, self exams reviewed. Followup as noted RTC ---routine checkup    6 mos -one year or p.r.n.     Encounter Times   Including precharting, reviewing chart, prior notes obtaining history: 10 minutes, medical exam :10 minutes, notes on body map, plan, counseling 10minutes My total time spent caring for the patient on the day of the encounter: 30 minutes     The patient indicates understanding of these issues and agrees to the plan. The patient is asked to return as noted in follow-up/ above. This note was generated using Dragon voice recognition software. Please contact me regarding any confusion resulting from errors in recognition. .   Note to patient and family: The [facility] makes medical notes like these available to patients. However, be advised this is a medical document. It is intended as [id number] communication and monitoring of a patient's care needs. It is written in medical language and may contain abbreviations or verbiage that are unfamiliar. It may appear blunt or direct. Medical documents are intended to carry relevant information, facts as evident and the clinical opinion of the practitioner.

## 2023-10-30 DIAGNOSIS — R10.84 GENERALIZED ABDOMINAL PAIN: ICD-10-CM

## 2023-10-31 RX ORDER — PANTOPRAZOLE SODIUM 40 MG/1
40 TABLET, DELAYED RELEASE ORAL
Qty: 30 TABLET | Refills: 0 | OUTPATIENT
Start: 2023-10-31

## 2023-10-31 RX ORDER — PANTOPRAZOLE SODIUM 40 MG/1
40 TABLET, DELAYED RELEASE ORAL
Qty: 90 TABLET | Refills: 3 | Status: SHIPPED | OUTPATIENT
Start: 2023-10-31

## 2023-10-31 NOTE — TELEPHONE ENCOUNTER
Called patient to inquire as to if the Pantoprazole is only short term as the fill was for only 30 days. Per patient she has an upcoming appt with GI so she appreciated the call and ok with a year supply.

## 2023-12-04 ENCOUNTER — OFFICE VISIT (OUTPATIENT)
Facility: CLINIC | Age: 33
End: 2023-12-04

## 2023-12-04 ENCOUNTER — TELEPHONE (OUTPATIENT)
Facility: CLINIC | Age: 33
End: 2023-12-04

## 2023-12-04 VITALS
WEIGHT: 293 LBS | SYSTOLIC BLOOD PRESSURE: 126 MMHG | BODY MASS INDEX: 45.99 KG/M2 | HEIGHT: 67 IN | DIASTOLIC BLOOD PRESSURE: 79 MMHG

## 2023-12-04 DIAGNOSIS — R11.0 NAUSEA: ICD-10-CM

## 2023-12-04 DIAGNOSIS — K59.00 CONSTIPATION, UNSPECIFIED CONSTIPATION TYPE: ICD-10-CM

## 2023-12-04 DIAGNOSIS — K42.9 UMBILICAL HERNIA WITHOUT OBSTRUCTION AND WITHOUT GANGRENE: ICD-10-CM

## 2023-12-04 DIAGNOSIS — R10.84 GENERALIZED ABDOMINAL PAIN: Primary | ICD-10-CM

## 2023-12-04 DIAGNOSIS — L03.316 CELLULITIS OF UMBILICUS: ICD-10-CM

## 2023-12-04 DIAGNOSIS — K21.9 GASTROESOPHAGEAL REFLUX DISEASE WITHOUT ESOPHAGITIS: ICD-10-CM

## 2023-12-04 DIAGNOSIS — K21.9 GASTROESOPHAGEAL REFLUX DISEASE, UNSPECIFIED WHETHER ESOPHAGITIS PRESENT: Primary | ICD-10-CM

## 2023-12-04 PROCEDURE — 3008F BODY MASS INDEX DOCD: CPT | Performed by: INTERNAL MEDICINE

## 2023-12-04 PROCEDURE — 3074F SYST BP LT 130 MM HG: CPT | Performed by: INTERNAL MEDICINE

## 2023-12-04 PROCEDURE — 99244 OFF/OP CNSLTJ NEW/EST MOD 40: CPT | Performed by: INTERNAL MEDICINE

## 2023-12-04 PROCEDURE — 3078F DIAST BP <80 MM HG: CPT | Performed by: INTERNAL MEDICINE

## 2023-12-04 NOTE — PATIENT INSTRUCTIONS
Start taking 1 capful of miralax a day. Drink at least 1-2 liters of water a day. Schedule a CT scan of your abdomen. Continue to take pantoprazole once daily. This is best taken 30-60 minutes before your first meal of the day. Lifestyle and dietary modifications were discussed today including but not limited to:     *Elevated head of bed at night     *Refrain from laying down after consuming a meal and do not eat meals 2-3 hours before bedtime. *Avoid tight clothing or belts. *Weight loss as able. *Eliminate dietary triggers (examples: caffeine, chocolate, spicy foods, fried/fatty foods, peppermint and carbonated beverages). *Avoid tobacco and alcohol as both can worsen acid reflux (reduces to pressure of the lower esophageal valve and smoking also reduces saliva production). *Consider oral lozenges or chewing gum throughout the day (promotes saliva to neutralize refluxed acid). *Avoid NSAIDs (ibuprofen, motrin, aleve, naproxen, etc) and aspirin as able. 1. Schedule an upper endoscopy (EGD) with MAC [Diagnosis: GERD unresponsive to PPI]    2. Do not eat or drink after midnight the night before your procedure. 3. Medication adjustments: Continue ALL medications as usual.    4. If you start any NEW medication after your visit today, please notify us. Certain medications will need to be held before the procedure, or the procedure cannot be performed. 5. You will need a ride home from your procedure since you are receiving sedation. Please ensure you will have an available ride home or the procedure cannot be performed.

## 2023-12-04 NOTE — TELEPHONE ENCOUNTER
Scheduled for:  EGD 62951  Provider Name:  Dr Carmen Lange  Date:  03/21/2024  Location:  [facility]  Sedation:  MAC  Time:  2510 (pt is aware to arrive at 0715)  Prep:  npo after midnight  Meds/Allergies Reconciled?:  Physician reviewed  Diagnosis with codes:  GERD K21.9  Was patient informed to call insurance with codes (Y/N):       Referral sent?:  Referral was sent at the time of electronic surgical scheduling. [facility] or [address] notified?:  I sent an electronic request to Endo Scheduling and received a confirmation today. Medication Orders:  Pt is aware to NOT take iron pills, herbal meds and diet supplements for 7 days before exam. Also to NOT take any form of alcohol, recreational drugs and any forms of ED meds 24 hours before exam.     Misc Orders:       Further instructions given by staff:  I discussed the prep intructions with the patient in office which SHE verbally understood. Copy of instructions was handed to patient as well. Patient was also advised about cancellation policy.

## 2023-12-04 NOTE — H&P
[facility], [facility] - Gastroenterology                                                                                                               Reason for consult: Abdominal pain    Requesting physician or provider: Tio Magana MD    Chief Complaint   Patient presents with    Consult     Acid reflux       HPI:   Holger Virk is a 34year old year-old woman with history of Prevotella cellulitis presenting for evaluation of abdominal pain. Last November she notes that she was diagnosed with Prevotella cellulitis after she noted umbilical erythema and drainage. She was treated with antibiotics. Since September of this year she notes abdominal pain that occurs around her umbilicus. She describes this as lightning bolts. She notes that she has always had stomach issues with lots of anxiety. She notes that this pain has been associated with frequent urination. She will have occurrence of the pain about 20-30 times a day. Her PCP felt like this was perhaps related to acid reflux and she was started on pantoprazole. She does feel like the medicine has helped. She notes that she does feel full in her abdomen. She also was having reflux sensation that would occur once a week. She describes this as throwing up into her mouth. She denies burning sensation in her chest or epigastrium. She has been having frequent burping. This was worse in September. This has improved with her PPI. She also endorses nausea that is improved. She was previously prescribed Zofran, but she is only taking this very infrequently if she really needs to. She has been taking her PPI 30 minutes before her first meal of the day. She notes that on her abdomen she has a hernia. This will be present when she sees herself in the mirror or sees her cell from the side. She endorses weight gain over the past year.   She also endorses sensation of choking on her saliva often. She denies solid food dysphagia. She notes that her right upper quadrant feels hard to the touch and that she experiences bloating in this area. She previously was having diarrhea daily. She was started on Prozac recently and now feels more constipated. She previously was having up to 5 bowel movements a day. Now she is having 1 bowel movement a day. She does still experience bloating. She developed a skin condition after COVID infection. She had COVID before she was vaccinated. She has been on Dupixent every 2 weeks for this. She also was noted to have a pulmonary nodule on imaging after this. She does note that in her early 25s she had multiple upper respiratory infections. She does not currently smoke. She previously intermittently smoked tobacco in high school. She uses marijuana daily. She usually smokes at night and drinks tonics. She feels this helps with her anxiety. She notes a family history on her maternal side of acid reflux. Her maternal uncle had an esophageal rupture. In October 2022 she had a CT scan of her abdomen and pelvis. This demonstrated a small fat-containing umbilical hernia with diffuse soft tissue thickening at the umbilicus in addition to surrounding inflammatory change. There were no abscesses noted. This has not been repeated since that time.       Wt Readings from Last 6 Encounters:   12/04/23 (!) 347 lb (157.4 kg)   10/16/23 (!) 350 lb (158.8 kg)   09/29/23 (!) 353 lb (160.1 kg)   09/27/23 (!) 353 lb (160.1 kg)   02/06/23 (!) 344 lb (156 kg)   12/12/22 (!) 348 lb (157.9 kg)        History, Medications, Allergies, ROS:      Past Medical History:   Diagnosis Date    Cellulitis 2022      Past Surgical History:   Procedure Laterality Date    TONSILLECTOMY Bilateral 01/01/2013      Family Hx:   Family History   Problem Relation Age of Onset    Breast Cancer Mother         s/p b/l mastectomy, neg BRCA    Cancer Maternal Grandmother         lung    Cancer Paternal Grandmother         ovarian, uterine      Social History:   Social History     Socioeconomic History    Marital status: Life Partner   Tobacco Use    Smoking status: Never    Smokeless tobacco: Never   Vaping Use    Vaping Use: Never used   Substance and Sexual Activity    Alcohol use: Not Currently    Drug use: Yes     Types: Cannabis   Other Topics Concern    Reaction to local anesthetic No        Medications (Active prior to today's visit):  Current Outpatient Medications   Medication Sig Dispense Refill    pantoprazole 40 MG Oral Tab EC Take 1 tablet (40 mg total) by mouth before breakfast. 90 tablet 3    Dupilumab (DUPIXENT) 300 MG/2ML Subcutaneous Solution Pen-injector Inject 300 mg into the skin every 14 (fourteen) days. 2 each 6    ondansetron (ZOFRAN) 8 MG tablet Take 1 tablet (8 mg total) by mouth every 8 (eight) hours as needed for Nausea. 30 tablet 3    albuterol 108 (90 Base) MCG/ACT Inhalation Aero Soln Inhale 2 puffs into the lungs every 4 (four) hours as needed for Wheezing or Shortness of Breath (chest tightness). 1 each 3    ERGOCALCIFEROL 1.25 MG (95477 UT) Oral Cap TAKE 1 CAPSULE BY MOUTH ONE TIME PER WEEK 12 capsule 1       Allergies:  No Known Allergies    ROS:   CONSTITUTIONAL:  negative for fevers, rigors  EYES:  negative for diplopia   RESPIRATORY:  negative for severe shortness of breath  CARDIOVASCULAR:  negative for crushing sub-sternal chest pain  GASTROINTESTINAL:  see HPI  GENITOURINARY:  negative for dysuria or gross hematuria  INTEGUMENT/BREAST:  SKIN:  negative for jaundice   ALLERGIC/IMMUNOLOGIC:  negative for hay fever  ENDOCRINE:  negative for cold intolerance and heat intolerance  MUSCULOSKELETAL:  negative for joint effusion/severe erythema  BEHAVIOR/PSYCH:  negative for psychotic behavior    PHYSICAL EXAM:   Blood pressure 126/79, height 5' 7\" (1.702 m), weight (!) 347 lb (157.4 kg), last menstrual period 10/09/2023.     Gen: appears comfortable and in no acute distress  HEENT: sclera appear anicteric, oropharynx clear, mucus membranes appear moist  CV:  the extremities are warm and well-perfused   Lung: no increased work of breathing, no conversational dyspnea   Abd: soft, non-tender exam in all quadrants without rigidity or guarding, non-distended, no masses palpated  Skin: no jaundice, no apparent rashes   Neuro: alert and interactive, no focal neuro deficits  Psych: cooperative, normal affect     Labs/Imaging:     Patient's pertinent labs and imaging were reviewed and discussed with patient today. ASSESSMENT/PLAN:   Rip Macdonald is a 34year old year-old woman with history of Prevotella cellulitis presenting for evaluation of abdominal pain. 1. Generalized abdominal pain    2. Umbilical hernia without obstruction and without gangrene    3. Cellulitis of umbilicus    4. Nausea    5. Gastroesophageal reflux disease without esophagitis    6. Constipation, unspecified constipation type      She presents today with multiple complaints. She notes a lightening bolt type pain to her umbilicus that occurs daily since September. This will occur about 20-30 times a day. She previously had a cellulitis of her umbilicus. Culture positive for Prevotella. She was treated with antibiotics. She had imaging done of her abdomen about 1 year ago demonstrating this cellulitis and was also noted to have an umbilical hernia. She notes the hernia on her abdomen. We will repeat a CT scan of her abdomen to further evaluate this. She was seen by a surgeon 1 year ago and was not recommended to have surgery given active infection. She will return to surgery for follow-up. She also endorses reflux despite PPI use. She also has intermittent nausea. Her symptoms have improved with PPI use but have not completely resolved. She also notes some difficulty swallowing her saliva. No solid food dysphagia. She notes abdominal fullness.   We will plan to evaluate further with an EGD to rule out esophagitis, gastritis, PUD, H. pylori, malignancy. We discussed GERD lifestyle modifications. She will continue to take her pantoprazole daily. She notes constipation since starting Prozac. She will start taking 1 capful of MiraLAX daily. Recommended increasing her water intake to 1 to 2 L a day. Recommendations:  Start taking 1 capful of miralax a day. Drink at least 1-2 liters of water a day. Schedule a CT scan of your abdomen. Continue to take pantoprazole once daily. This is best taken 30-60 minutes before your first meal of the day. Lifestyle and dietary modifications were discussed today including but not limited to:     *Elevated head of bed at night     *Refrain from laying down after consuming a meal and do not eat meals 2-3 hours before bedtime. *Avoid tight clothing or belts. *Weight loss as able. *Eliminate dietary triggers (examples: caffeine, chocolate, spicy foods, fried/fatty foods, peppermint and carbonated beverages). *Avoid tobacco and alcohol as both can worsen acid reflux (reduces to pressure of the lower esophageal valve and smoking also reduces saliva production). *Consider oral lozenges or chewing gum throughout the day (promotes saliva to neutralize refluxed acid). *Avoid NSAIDs (ibuprofen, motrin, aleve, naproxen, etc) and aspirin as able. 1. Schedule an upper endoscopy (EGD) with MAC [Diagnosis: GERD unresponsive to PPI]    2. Do not eat or drink after midnight the night before your procedure. 3. Medication adjustments: Continue ALL medications as usual.    4. If you start any NEW medication after your visit today, please notify us. Certain medications will need to be held before the procedure, or the procedure cannot be performed. 5. You will need a ride home from your procedure since you are receiving sedation.  Please ensure you will have an available ride home or the procedure cannot be performed. Orders This Visit:  No orders of the defined types were placed in this encounter.       Meds This Visit:  Requested Prescriptions      No prescriptions requested or ordered in this encounter       Imaging & Referrals:  SURGERY - INTERNAL  CT ABDOMEN+PELVIS(CONTRAST ONLY)(AMX=[id number])       Percy Woods MD  [facility], [facility] Gastroenterology  12/4/2023

## 2023-12-17 ENCOUNTER — HOSPITAL ENCOUNTER (OUTPATIENT)
Dept: CT IMAGING | Age: 33
Discharge: HOME | End: 2023-12-17
Attending: INTERNAL MEDICINE
Payer: COMMERCIAL

## 2023-12-17 ENCOUNTER — HOSPITAL ENCOUNTER (OUTPATIENT)
Dept: CT IMAGING | Age: 33
End: 2023-12-17
Attending: INTERNAL MEDICINE
Payer: COMMERCIAL

## 2023-12-17 DIAGNOSIS — R10.84 GENERALIZED ABDOMINAL PAIN: ICD-10-CM

## 2023-12-17 LAB
CREAT BLD-MCNC: 0.7 MG/DL
EGFRCR SERPLBLD CKD-EPI 2021: 119 ML/MIN/1.73M2 (ref 60–?)

## 2023-12-17 PROCEDURE — 74177 CT ABD & PELVIS W/CONTRAST: CPT | Performed by: INTERNAL MEDICINE

## 2023-12-17 PROCEDURE — 82565 ASSAY OF CREATININE: CPT

## 2023-12-19 DIAGNOSIS — R11.0 NAUSEA: ICD-10-CM

## 2023-12-20 RX ORDER — ONDANSETRON HYDROCHLORIDE 8 MG/1
8 TABLET, FILM COATED ORAL EVERY 8 HOURS PRN
Qty: 30 TABLET | Refills: 3 | Status: SHIPPED | OUTPATIENT
Start: 2023-12-20

## 2023-12-20 NOTE — TELEPHONE ENCOUNTER
Please review. Protocol failed/ No protocol      Requested Prescriptions   Pending Prescriptions Disp Refills    ondansetron (ZOFRAN) 8 MG tablet 30 tablet 3     Sig: Take 1 tablet (8 mg total) by mouth every 8 (eight) hours as needed for Nausea.        There is no refill protocol information for this order          Future Appointments         Provider Department Appt Notes    In 3 weeks Ochoa Carreno MD [facility] for Pelvic [address] [facility] Urogynecology freq. urination    In 3 months MANDA, [address] [place] [address] 20, [address] [facility] EGD w MAC @ [address] [place] [address]             Recent Outpatient Visits              2 weeks ago Generalized abdominal pain    Ramona Alexis [place] MD González    Office Visit    2 months ago [facility]    [facility]    2 months ago Other [facility] dermatitis    [facility]    [facility]    2 months ago Nausea    [facility]    Office Visit    2 months ago Umbilical pain    [facility] [facility], Anatoliy Olsen APRN    Office Visit

## 2024-01-16 ENCOUNTER — OFFICE VISIT (OUTPATIENT)
Dept: UROLOGY | Facility: HOSPITAL | Age: 34
End: 2024-01-16
Attending: OBSTETRICS & GYNECOLOGY
Payer: COMMERCIAL

## 2024-01-16 VITALS — BODY MASS INDEX: 45.99 KG/M2 | WEIGHT: 293 LBS | RESPIRATION RATE: 18 BRPM | HEIGHT: 67 IN

## 2024-01-16 DIAGNOSIS — N32.81 URGENCY-FREQUENCY SYNDROME: Primary | ICD-10-CM

## 2024-01-16 DIAGNOSIS — E66.01 OBESITY, MORBID, BMI 50 OR HIGHER (HCC): ICD-10-CM

## 2024-01-16 DIAGNOSIS — N81.89 PELVIC FLOOR WEAKNESS: ICD-10-CM

## 2024-01-16 LAB
BLOOD URINE: NEGATIVE
CONTROL RUN WITHIN 24 HOURS?: YES
LEUKOCYTE ESTERASE URINE: NEGATIVE
NITRITE URINE: NEGATIVE

## 2024-01-16 PROCEDURE — 87086 URINE CULTURE/COLONY COUNT: CPT | Performed by: OBSTETRICS & GYNECOLOGY

## 2024-01-16 PROCEDURE — 81002 URINALYSIS NONAUTO W/O SCOPE: CPT | Performed by: OBSTETRICS & GYNECOLOGY

## 2024-01-16 PROCEDURE — 51701 INSERT BLADDER CATHETER: CPT | Performed by: OBSTETRICS & GYNECOLOGY

## 2024-01-16 RX ORDER — FLUOXETINE 20 MG/1
20 TABLET, FILM COATED ORAL DAILY
COMMUNITY

## 2024-01-16 NOTE — PROGRESS NOTES
ID: Xena Gallardo  : 1990  Date: 2024     Referred by Dr. Dinah Bui. MD    Chief Complaint   Patient presents with    Urinary Urgency    Urinary Frequency       HPI:  33 year old female, G0, who presents for evaluation of urinary urgency and frequency x 4 months.  Was treated empirically with antibiotics at the end of September/early October.  Urine culture at the time showed contamination.  Denies dysuria.  Has low pelvic cramping. Continues to void often, sometimes every 1 hour or more during the day.  Nocturia is x 3.  No HECTOR or UUI.  Wonders if she is emptying completely. Bowels are regular, sometimes many times per day.  Has GERD, follows up with GI. Was told to drink a lot of water and drinks up to 120 ounces of water daily.  Reports regular periods.  Not on any contraception. She is celibate.  Had CT of abdomen and pelvis on 2023 that was unremarkable.     PMHx: GERD, morbid obesity with BMI of 54, history of abdominal wall cellulitis, umbilical hernia, anxiety/depression.         Urogynecology Summary:  Urogynecology Summary  Prolapse: No (Reports \"vaginal pressure or fullness\" at times)  HECTOR: No  Urge Incontinence: No  Nocturia Frequency: 3  Frequency: Less than 1 hour (Reports 10-30x/day depending on day)  Incomplete emptying: Yes  Constipation: Yes (Reviewed regimen, fiber usage - now using Miralax PRN)  Wears pad day?: 0  Wears Pad Night?: 0  Activities are limited by UI/POP?: No  Currently Sexually Active: No  Avoids sexual activity due to: Other          HISTORY:  Past Medical History:   Diagnosis Date    Cellulitis       Past Surgical History:   Procedure Laterality Date    TONSILLECTOMY Bilateral 2013      Family History   Problem Relation Age of Onset    Breast Cancer Mother         s/p b/l mastectomy, neg BRCA    Cancer Maternal Grandmother         lung    Cancer Paternal Grandmother         ovarian, uterine      Social History     Socioeconomic History    Marital  status: Life Partner   Tobacco Use    Smoking status: Never    Smokeless tobacco: Never   Vaping Use    Vaping Use: Never used   Substance and Sexual Activity    Alcohol use: Not Currently    Drug use: Yes     Types: Cannabis   Other Topics Concern    Reaction to local anesthetic No        Allergies:  No Known Allergies    Medications:  Outpatient Medications Prior to Visit   Medication Sig Dispense Refill    FLUoxetine HCl 40 MG Oral Cap Take 1 tablet (20 mg total) by mouth daily.      ondansetron (ZOFRAN) 8 MG tablet Take 1 tablet (8 mg total) by mouth every 8 (eight) hours as needed for Nausea. 30 tablet 3    pantoprazole 40 MG Oral Tab EC Take 1 tablet (40 mg total) by mouth before breakfast. 90 tablet 3    Dupilumab (DUPIXENT) 300 MG/2ML Subcutaneous Solution Pen-injector Inject 300 mg into the skin every 14 (fourteen) days. 2 each 6    albuterol 108 (90 Base) MCG/ACT Inhalation Aero Soln Inhale 2 puffs into the lungs every 4 (four) hours as needed for Wheezing or Shortness of Breath (chest tightness). 1 each 3    ERGOCALCIFEROL 1.25 MG (96320 UT) Oral Cap TAKE 1 CAPSULE BY MOUTH ONE TIME PER WEEK (Patient not taking: Reported on 1/16/2024) 12 capsule 1     No facility-administered medications prior to visit.       Review of Systems:    A comprehensive 12 point review of systems was completed.  Pertinent positives noted in the the HPI.  Denies CP  Denies SOB    Vitals:  Resp 18   Ht 67\"   Wt (!) 347 lb (157.4 kg)   LMP 10/09/2023 (Exact Date)   BMI 54.35 kg/m²        GENERAL EXAM:  GENERAL:  Alert and oriented. Well-nourished, normally developed.  Thought and emotional status are appropriate, speech is understandable.  No acute distress.   HEAD: Normocephalic and atraumatic with normal hair distribution  LUNGS:  Normal respiratory effort.    ABDOMEN: Soft, obese, non tender.   EXTREMITIES:  Without edema, varicosities or lesions.   SKIN:  Warm and dry, with good color and turgor. No lesions.    PELVIC  EXAM:  External Genitalia: Normal appearance for age. No atrophy, no lesions  Urethra: no atrophy, non tender  Bladder:no fullness, non tender  Vagina: no atrophy, no lesions   Cervix: Unable to visualize.   Uterus: limited exam due to body habitus.   Adnexa: N/A  Perineum: non tender  Anus: no hemorrhoids  Rectum: deferred.     PELVIS FLOOR NEUROMUSCULAR FUNCTION:  Strength:  2  Perineal Sensation:  Normal      PELVIC SUPPORT:  Latexo:  0  Ant:  0  Post:  0  CST:  negative  UVJ: not hypermobile    Voided 100 mL in the bathroom.  Was catheterized for PVR of 10 mL.  Urine dip negative.  Specimen sent for C&S.     Impression/Plan:    ICD-10-CM    1. Urgency-frequency syndrome  N32.81 POCT urinalysis dipstick[50591]     Urine Culture, Routine     Straight Cath PVR      2. Obesity, morbid, BMI 50 or higher ([facility])  E66.01       3. Pelvic floor weakness  N81.89           Discussion Items:   Behavioral and pharmacologic treatments for OAB  Pelvic muscle rehabilitation including pelvic floor PT  Discussed dietary and behavioral modification, discussed pharmacologic and nonpharmacologic mgmt options for urinary symptoms. Discussed dietary & weight management with potential improvements in symptoms with weight loss.    Diagnostic Items:  Urine C&S    Medications Discussed:  Myrbetriq 50 mg PO daily (one month therapeutic trial provided.  Will send rx as well. Discount card given)/     Treatment Plan, Non-surgical:   Pelvic floor PT.  Referral provided.     Treatment Plan, Surgical:   Not indicated.     Pt verbalizes understanding of all above discussed information.  Follow up in 4 months with JESSICA Guerra or sooner prn.       Salma Jimenes MD, FACOG, FACS  Female Pelvic Medicine and  Reconstructive Surgery (Urogynecology)  [phone] (Pager)

## 2024-03-14 ENCOUNTER — PATIENT MESSAGE (OUTPATIENT)
Facility: CLINIC | Age: 34
End: 2024-03-14

## 2024-03-15 NOTE — TELEPHONE ENCOUNTER
From: Xena Gallardo  To: Rhiannon Veras  Sent: 3/14/2024 6:07 PM CDT  Subject: Upcoming procedure     Hello I am reaching out to clarify the no eating and drinking rules before my upcoming appointment. I was on the phone with the someone but it was not mentioned when I should be accepted to stop eating and drinking. Thank you in advance.     Xena

## 2024-03-21 ENCOUNTER — ANESTHESIA EVENT (OUTPATIENT)
Dept: ENDOSCOPY | Facility: HOSPITAL | Age: 34
End: 2024-03-21
Payer: COMMERCIAL

## 2024-03-21 ENCOUNTER — HOSPITAL ENCOUNTER (OUTPATIENT)
Facility: HOSPITAL | Age: 34
Setting detail: HOSPITAL OUTPATIENT SURGERY
Discharge: HOME | End: 2024-03-21
Attending: INTERNAL MEDICINE | Admitting: INTERNAL MEDICINE
Payer: COMMERCIAL

## 2024-03-21 ENCOUNTER — ANESTHESIA (OUTPATIENT)
Dept: ENDOSCOPY | Facility: HOSPITAL | Age: 34
End: 2024-03-21
Payer: COMMERCIAL

## 2024-03-21 DIAGNOSIS — K21.9 GASTROESOPHAGEAL REFLUX DISEASE, UNSPECIFIED WHETHER ESOPHAGITIS PRESENT: ICD-10-CM

## 2024-03-21 LAB — B-HCG UR QL: NEGATIVE

## 2024-03-21 PROCEDURE — 0DB18ZX EXCISION OF UPPER ESOPHAGUS, VIA NATURAL OR ARTIFICIAL OPENING ENDOSCOPIC, DIAGNOSTIC: ICD-10-PCS | Performed by: INTERNAL MEDICINE

## 2024-03-21 PROCEDURE — 43239 EGD BIOPSY SINGLE/MULTIPLE: CPT | Performed by: INTERNAL MEDICINE

## 2024-03-21 PROCEDURE — 0DB38ZX EXCISION OF LOWER ESOPHAGUS, VIA NATURAL OR ARTIFICIAL OPENING ENDOSCOPIC, DIAGNOSTIC: ICD-10-PCS | Performed by: INTERNAL MEDICINE

## 2024-03-21 PROCEDURE — 0DB98ZX EXCISION OF DUODENUM, VIA NATURAL OR ARTIFICIAL OPENING ENDOSCOPIC, DIAGNOSTIC: ICD-10-PCS | Performed by: INTERNAL MEDICINE

## 2024-03-21 PROCEDURE — 0DB78ZX EXCISION OF STOMACH, PYLORUS, VIA NATURAL OR ARTIFICIAL OPENING ENDOSCOPIC, DIAGNOSTIC: ICD-10-PCS | Performed by: INTERNAL MEDICINE

## 2024-03-21 RX ORDER — SODIUM CHLORIDE, SODIUM LACTATE, POTASSIUM CHLORIDE, CALCIUM CHLORIDE 600; 310; 30; 20 MG/100ML; MG/100ML; MG/100ML; MG/100ML
INJECTION, SOLUTION INTRAVENOUS CONTINUOUS
Status: DISCONTINUED | OUTPATIENT
Start: 2024-03-21 | End: 2024-03-21

## 2024-03-21 RX ORDER — NALOXONE HYDROCHLORIDE 0.4 MG/ML
0.08 INJECTION, SOLUTION INTRAMUSCULAR; INTRAVENOUS; SUBCUTANEOUS ONCE AS NEEDED
Status: DISCONTINUED | OUTPATIENT
Start: 2024-03-21 | End: 2024-03-21

## 2024-03-21 RX ORDER — LIDOCAINE HYDROCHLORIDE 10 MG/ML
INJECTION, SOLUTION EPIDURAL; INFILTRATION; INTRACAUDAL; PERINEURAL AS NEEDED
Status: DISCONTINUED | OUTPATIENT
Start: 2024-03-21 | End: 2024-03-21 | Stop reason: SURG

## 2024-03-21 RX ADMIN — SODIUM CHLORIDE, SODIUM LACTATE, POTASSIUM CHLORIDE, CALCIUM CHLORIDE: 600; 310; 30; 20 INJECTION, SOLUTION INTRAVENOUS at 08:21:00

## 2024-03-21 RX ADMIN — LIDOCAINE HYDROCHLORIDE 100 MG: 10 INJECTION, SOLUTION EPIDURAL; INFILTRATION; INTRACAUDAL; PERINEURAL at 08:21:00

## 2024-03-21 NOTE — DISCHARGE INSTRUCTIONS
Home Care Instructions for Gastroscopy with Sedation    Diet:  - Resume your regular diet as tolerated unless otherwise instructed.  - Start with light meals to minimize bloating.  - Do not drink alcohol today.    Medication:  - If you have questions about resuming your normal medications, please contact your Primary Care Physician.    Activities:  - Take it easy today. Do not return to work today.  - Do not drive today.  - Do not operate any machinery today (including kitchen equipment).    Gastroscopy:  - You may have a sore throat for 2-3 days following the exam. This is normal. Gargling with warm salt water (1/2 tsp salt to 1 glass warm water) or using throat lozenges will help.  - If you experience any sharp pain in your neck, abdomen or chest, vomiting of blood, oral temperature over 100 degrees Fahrenheit, light-headedness or dizziness, or any other problems, contact your doctor.    **If unable to reach your doctor, please go to the [facility] Emergency Room**    - Your referring physician will receive a full report of your examination.  - If you do not hear from your doctor's office within two weeks of your biopsy, please call them for your results.    You may be able to see your laboratory results in [facility] between 4 and 7 business days.  In some cases, your physician may not have viewed the results before they are released to [facility].  If you have questions regarding your results contact the physician who ordered the test/exam by phone or via [facility] by choosing \"Ask a Medical Question.\"

## 2024-03-21 NOTE — ANESTHESIA POSTPROCEDURE EVALUATION
Patient: Xena Gallardo    Procedure Summary       Date: 03/21/24 Room / Location: [facility] ENDOSCOPY 05 / EM ENDOSCOPY    Anesthesia Start: 0821 Anesthesia Stop:     Procedure: ESOPHAGOGASTRODUODENOSCOPY Diagnosis:       Gastroesophageal reflux disease, unspecified whether esophagitis present      (Gastric Erythema)    Surgeons: Rhiannon Veras MD Anesthesiologist: Tito Myers CRNA    Anesthesia Type: general ASA Status: 2            Anesthesia Type: No value filed.    Vitals Value Taken Time   BP 95/63 03/21/24 0840   Temp 98 °F (36.7 °C) 03/21/24 0840   Pulse 67 03/21/24 0840   Resp 12 03/21/24 0840   SpO2 96 % 03/21/24 0840       EM AN Post Evaluation:   Patient Evaluated in PACU  Patient Participation: complete - patient participated  Level of Consciousness: sleepy but conscious  Pain Score: 0  Pain Management: adequate  Airway Patency:patent  Dental exam unchanged from preop  Yes    Cardiovascular Status: acceptable  Respiratory Status: acceptable  Postoperative Hydration acceptable      Tito Myers CRNA  3/21/2024 8:42 AM

## 2024-03-21 NOTE — ANESTHESIA PREPROCEDURE EVALUATION
Anesthesia PreOp Note    HPI:     Xena Gallardo is a 33 year old female who presents for preoperative consultation requested by: Rhiannon Veras MD    Date of Surgery: 3/21/2024    Procedure(s):  ESOPHAGOGASTRODUODENOSCOPY  Indication: Gastroesophageal reflux disease, unspecified whether esophagitis present    Relevant Problems   No relevant active problems       NPO:  Last Liquid Consumption Date: 24  Last Liquid Consumption Time:   Last Solid Consumption Date: 24  Last Solid Consumption Time:   Last Liquid Consumption Date: 24          History Review:  Patient Active Problem List    Diagnosis Date Noted    Urgency-frequency syndrome 2024    Pelvic floor weakness 2024    Cellulitis 2022    Cellulitis, umbilical 2022    Cellulitis of umbilicus 2022    Abscess of skin of abdomen 10/17/2022    Flat feet, bilateral 2021    Obesity, morbid, BMI 50 or higher (HCC) 2021    COVID-19 2020       Past Medical History:   Diagnosis Date    Cellulitis        Past Surgical History:   Procedure Laterality Date    TONSILLECTOMY Bilateral 2013       Medications Prior to Admission   Medication Sig Dispense Refill Last Dose    FLUoxetine HCl 40 MG Oral Cap Take 1 tablet (20 mg total) by mouth daily.   3/20    ondansetron (ZOFRAN) 8 MG tablet Take 1 tablet (8 mg total) by mouth every 8 (eight) hours as needed for Nausea. 30 tablet 3     pantoprazole 40 MG Oral Tab EC Take 1 tablet (40 mg total) by mouth before breakfast. 90 tablet 3 3/20    Dupilumab (DUPIXENT) 300 MG/2ML Subcutaneous Solution Pen-injector Inject 300 mg into the skin every 14 (fourteen) days. 2 each 6     [] albuterol 108 (90 Base) MCG/ACT Inhalation Aero Soln Inhale 2 puffs into the lungs every 4 (four) hours as needed for Wheezing or Shortness of Breath (chest tightness). 1 each 3     ERGOCALCIFEROL 1.25 MG (46840 UT) Oral Cap TAKE 1 CAPSULE BY MOUTH ONE TIME  PER WEEK 12 capsule 1      Current Facility-Administered Medications Ordered in Epic   Medication Dose Route Frequency Provider Last Rate Last Admin    lactated ringers infusion   Intravenous Continuous Rhiannon Veras MD         No current [facility]-ordered outpatient medications on file.       No Known Allergies    Family History   Problem Relation Age of Onset    Breast Cancer Mother         s/p b/l mastectomy, neg BRCA    Cancer Maternal Grandmother         lung    Cancer Paternal Grandmother         ovarian, uterine     Social History     Socioeconomic History    Marital status: Life Partner   Tobacco Use    Smoking status: Never    Smokeless tobacco: Never   Vaping Use    Vaping Use: Never used   Substance and Sexual Activity    Alcohol use: Not Currently    Drug use: Yes     Types: Cannabis     Comment: occ   Other Topics Concern    Reaction to local anesthetic No       Available pre-op labs reviewed.             Vital Signs:  Body mass index is 54.35 kg/m².   height is 1.702 m (5' 7\") and weight is 157.4 kg (347 lb) (abnormal).   Vitals:    03/11/24 1235 03/21/24 0728   Weight: (!) 157.4 kg (347 lb) (!) 157.4 kg (347 lb)   Height: 1.702 m (5' 7\") 1.702 m (5' 7\")        Anesthesia Evaluation     Patient summary reviewed and Nursing notes reviewed    Airway   Mallampati: II  TM distance: >3 FB  Neck ROM: full  Dental      Pulmonary - normal exam   Cardiovascular - negative ROS and normal exam    Neuro/Psych    (+)  neuromuscular disease,        GI/Hepatic/Renal - negative ROS     Endo/Other - negative ROS   Abdominal   (+) obese                 Anesthesia Plan:   ASA:  2  Plan:   General  Informed Consent Plan and Risks Discussed With:  Patient  Discussed plan with:  CRNA      I have informed Xena Gallardo and/or legal guardian or family member of the nature of the anesthetic plan, benefits, risks including possible dental damage if relevant, major complications, and any alternative forms of  anesthetic management.   All of the patient's questions were answered to the best of my ability. The patient desires the anesthetic management as planned.  Tito Myers CRNA  3/21/2024 7:39 AM  Present on Admission:  **None**

## 2024-03-21 NOTE — H&P
History & Physical Examination    Patient Name: Xena Gallardo  MRN: [id number]  CSN: [id number]  YOB: 1990    Diagnosis: Uncontrolled GERD, EGD today    Medications Prior to Admission   Medication Sig Dispense Refill Last Dose    FLUoxetine HCl 40 MG Oral Cap Take 1 tablet (20 mg total) by mouth daily.   3/20    ondansetron (ZOFRAN) 8 MG tablet Take 1 tablet (8 mg total) by mouth every 8 (eight) hours as needed for Nausea. 30 tablet 3     pantoprazole 40 MG Oral Tab EC Take 1 tablet (40 mg total) by mouth before breakfast. 90 tablet 3 3/20    Dupilumab (DUPIXENT) 300 MG/2ML Subcutaneous Solution Pen-injector Inject 300 mg into the skin every 14 (fourteen) days. 2 each 6     [] albuterol 108 (90 Base) MCG/ACT Inhalation Aero Soln Inhale 2 puffs into the lungs every 4 (four) hours as needed for Wheezing or Shortness of Breath (chest tightness). 1 each 3     ERGOCALCIFEROL 1.25 MG (29572 UT) Oral Cap TAKE 1 CAPSULE BY MOUTH ONE TIME PER WEEK 12 capsule 1      Current Facility-Administered Medications   Medication Dose Route Frequency    lactated ringers infusion   Intravenous Continuous       Allergies: No Known Allergies    Past Medical History:   Diagnosis Date    Cellulitis      Past Surgical History:   Procedure Laterality Date    TONSILLECTOMY Bilateral 2013     Family History   Problem Relation Age of Onset    Breast Cancer Mother         s/p b/l mastectomy, neg BRCA    Cancer Maternal Grandmother         lung    Cancer Paternal Grandmother         ovarian, uterine     Social History     Tobacco Use    Smoking status: Never    Smokeless tobacco: Never   Substance Use Topics    Alcohol use: Not Currently       SYSTEM Check if Review is Normal Check if Physical Exam is Normal If not normal, please explain:   HEENT [X ] [ X]    NECK  [X ] [ X]    HEART [X ] [ X]    LUNGS [X ] [ X]    ABDOMEN [X ] [ X]    EXTREMITIES [X ] [ X]    OTHER        I have discussed the risks and benefits  and alternatives of the procedure with the patient/family.  They understand and agree to proceed with plan of care.   I have reviewed the History and Physical done within the last 30 days.  Any changes noted above.    Rhiannon Veras MD  [facility]

## 2024-03-21 NOTE — OPERATIVE REPORT
ESOPHAGOGASTRODUODENOSCOPY (EGD) REPORT    Xena Gallardo     1990 Age 33 year old   PCP Dinah Bui MD Endoscopist Rhiannon Veras MD       Date of procedure: 24    Procedure: EGD w/ biopsies     Pre-operative diagnosis: uncontrolled GERD     Post-operative diagnosis: antral erythema     Medications: MAC    Complications: none    Procedure:  Informed consent was obtained from the patient after the risks of the procedure were discussed, including but not limited to bleeding, perforation, aspiration, infection, or possibility of a missed lesion. After discussions of the risks/benefits and alternatives to this procedure, as well as the planned sedation, the patient was placed in the left lateral decubitus position and begun on continuous blood pressure pulse oximetry and EKG monitoring and this was maintained throughout the procedure. Once an adequate level of sedation was obtained a bite block was placed. Then the lubricated tip of the [id number] diagnostic video upper endoscope was inserted and advanced using direct visualization into the posterior pharynx and ultimately into the esophagus. The scope was then advanced through the stomach and to the second portion of the duodenum.    Complications: None    Findings:      1. Esophagus: The squamocolumnar junction was noted at 38 cm and appeared regular. The diaphragmatic pinch was noted noted at 38 cm from the incisors. The esophageal mucosa appeared normal. There was no endoscopic findings of esophagitis, stricture or Dunaway's esophagus. Biopsies were obtained from the distal and proximal esophagus to assess for eosinophilic esophagitis.     2. Stomach: The stomach distended normally. Normal rugal folds were seen. The pylorus was patent. Retroflexion revealed a normal fundus. The gastric mucosa appeared mildly erythematous in a striped patten in the antrum. Biopsies were taken with a cold forceps from the antrum, incisura and body  for histology.     3. Duodenum: The duodenal mucosa appeared normal in the bulb and 2nd portion of the duodenum. Biopsies were obtained from the second portion of the duodenum.     Impression:  -Esophagus: Normal. Biopsies obtained from the distal and proximal esophagus for EoE.   -Stomach: Mild antral erythema. Biopsies obtained for h pylori.   -Duodenum: Normal. Biopsied.     Recommend:  1. Await pathology.   2. Continue PPI daily.   3. Consider marijuana cessation.     >>>If tissue was sampled/removed and you have not received your pathology results either by phone or letter within 2 weeks, please call our office at [phone].    Specimens:  Duodenal biopsies, gastric biopsies, distal esophageal biopsies, proximal esophageal biopsies     Blood loss: <1 ml

## 2024-03-22 VITALS
BODY MASS INDEX: 45.99 KG/M2 | SYSTOLIC BLOOD PRESSURE: 121 MMHG | TEMPERATURE: 98 F | HEIGHT: 67 IN | HEART RATE: 67 BPM | WEIGHT: 293 LBS | DIASTOLIC BLOOD PRESSURE: 78 MMHG | RESPIRATION RATE: 15 BRPM | OXYGEN SATURATION: 97 %

## 2024-03-25 RX ORDER — CLONAZEPAM 0.5 MG/1
0.5 TABLET ORAL DAILY PRN
COMMUNITY
Start: 2024-01-30

## 2024-03-25 RX ORDER — CHLORHEXIDINE GLUCONATE ORAL RINSE 1.2 MG/ML
SOLUTION DENTAL
COMMUNITY
Start: 2023-12-11

## 2024-03-27 ENCOUNTER — LAB ENCOUNTER (OUTPATIENT)
Dept: LAB | Age: 34
End: 2024-03-27
Attending: STUDENT IN AN ORGANIZED HEALTH CARE EDUCATION/TRAINING PROGRAM
Payer: COMMERCIAL

## 2024-03-27 ENCOUNTER — OFFICE VISIT (OUTPATIENT)
Dept: FAMILY MEDICINE CLINIC | Facility: CLINIC | Age: 34
End: 2024-03-27

## 2024-03-27 VITALS
SYSTOLIC BLOOD PRESSURE: 126 MMHG | DIASTOLIC BLOOD PRESSURE: 70 MMHG | WEIGHT: 293 LBS | TEMPERATURE: 98 F | HEART RATE: 76 BPM | BODY MASS INDEX: 45.99 KG/M2 | HEIGHT: 67 IN | OXYGEN SATURATION: 97 %

## 2024-03-27 DIAGNOSIS — R91.1 INCIDENTAL LUNG NODULE: ICD-10-CM

## 2024-03-27 DIAGNOSIS — H65.01 RIGHT ACUTE SEROUS OTITIS MEDIA, RECURRENCE NOT SPECIFIED: ICD-10-CM

## 2024-03-27 DIAGNOSIS — Z00.00 WELL ADULT EXAM: ICD-10-CM

## 2024-03-27 DIAGNOSIS — Z00.00 WELL ADULT EXAM: Primary | ICD-10-CM

## 2024-03-27 DIAGNOSIS — H91.91 DECREASED HEARING OF RIGHT EAR: ICD-10-CM

## 2024-03-27 LAB
ALBUMIN SERPL-MCNC: 4.3 G/DL (ref 3.2–4.8)
ALBUMIN/GLOB SERPL: 1.7 {RATIO} (ref 1–2)
ALP LIVER SERPL-CCNC: 71 U/L
ALT SERPL-CCNC: 15 U/L
ANION GAP SERPL CALC-SCNC: 9 MMOL/L (ref 0–18)
AST SERPL-CCNC: 15 U/L (ref ?–34)
BILIRUB SERPL-MCNC: 0.6 MG/DL (ref 0.3–1.2)
BUN BLD-MCNC: 11 MG/DL (ref 9–23)
BUN/CREAT SERPL: 17.5 (ref 10–20)
CALCIUM BLD-MCNC: 9.2 MG/DL (ref 8.7–10.4)
CHLORIDE SERPL-SCNC: 107 MMOL/L (ref 98–112)
CHOLEST SERPL-MCNC: 135 MG/DL (ref ?–200)
CO2 SERPL-SCNC: 23 MMOL/L (ref 21–32)
CREAT BLD-MCNC: 0.63 MG/DL
DEPRECATED RDW RBC AUTO: 42 FL (ref 35.1–46.3)
EGFRCR SERPLBLD CKD-EPI 2021: 122 ML/MIN/1.73M2 (ref 60–?)
ERYTHROCYTE [DISTWIDTH] IN BLOOD BY AUTOMATED COUNT: 13.2 % (ref 11–15)
EST. AVERAGE GLUCOSE BLD GHB EST-MCNC: 108 MG/DL (ref 68–126)
FASTING PATIENT LIPID ANSWER: YES
FASTING STATUS PATIENT QL REPORTED: YES
GLOBULIN PLAS-MCNC: 2.5 G/DL (ref 2.8–4.4)
GLUCOSE BLD-MCNC: 91 MG/DL (ref 70–99)
HBA1C MFR BLD: 5.4 % (ref ?–5.7)
HCT VFR BLD AUTO: 40.5 %
HDLC SERPL-MCNC: 50 MG/DL (ref 40–59)
HGB BLD-MCNC: 13.7 G/DL
LDLC SERPL CALC-MCNC: 65 MG/DL (ref ?–100)
MCH RBC QN AUTO: 29.5 PG (ref 26–34)
MCHC RBC AUTO-ENTMCNC: 33.8 G/DL (ref 31–37)
MCV RBC AUTO: 87.1 FL
NONHDLC SERPL-MCNC: 85 MG/DL (ref ?–130)
OSMOLALITY SERPL CALC.SUM OF ELEC: 287 MOSM/KG (ref 275–295)
PLATELET # BLD AUTO: 268 10(3)UL (ref 150–450)
POTASSIUM SERPL-SCNC: 4 MMOL/L (ref 3.5–5.1)
PROT SERPL-MCNC: 6.8 G/DL (ref 5.7–8.2)
RBC # BLD AUTO: 4.65 X10(6)UL
SODIUM SERPL-SCNC: 139 MMOL/L (ref 136–145)
TRIGL SERPL-MCNC: 109 MG/DL (ref 30–149)
TSI SER-ACNC: 1.68 MIU/ML (ref 0.55–4.78)
VIT D+METAB SERPL-MCNC: 14.4 NG/ML (ref 30–100)
VLDLC SERPL CALC-MCNC: 16 MG/DL (ref 0–30)
WBC # BLD AUTO: 7.1 X10(3) UL (ref 4–11)

## 2024-03-27 PROCEDURE — 36415 COLL VENOUS BLD VENIPUNCTURE: CPT

## 2024-03-27 PROCEDURE — 84443 ASSAY THYROID STIM HORMONE: CPT

## 2024-03-27 PROCEDURE — 83036 HEMOGLOBIN GLYCOSYLATED A1C: CPT

## 2024-03-27 PROCEDURE — 3074F SYST BP LT 130 MM HG: CPT | Performed by: STUDENT IN AN ORGANIZED HEALTH CARE EDUCATION/TRAINING PROGRAM

## 2024-03-27 PROCEDURE — 3078F DIAST BP <80 MM HG: CPT | Performed by: STUDENT IN AN ORGANIZED HEALTH CARE EDUCATION/TRAINING PROGRAM

## 2024-03-27 PROCEDURE — 3008F BODY MASS INDEX DOCD: CPT | Performed by: STUDENT IN AN ORGANIZED HEALTH CARE EDUCATION/TRAINING PROGRAM

## 2024-03-27 PROCEDURE — 99395 PREV VISIT EST AGE 18-39: CPT | Performed by: STUDENT IN AN ORGANIZED HEALTH CARE EDUCATION/TRAINING PROGRAM

## 2024-03-27 PROCEDURE — 80053 COMPREHEN METABOLIC PANEL: CPT

## 2024-03-27 PROCEDURE — 80061 LIPID PANEL: CPT

## 2024-03-27 PROCEDURE — 82306 VITAMIN D 25 HYDROXY: CPT

## 2024-03-27 PROCEDURE — 85027 COMPLETE CBC AUTOMATED: CPT

## 2024-03-27 RX ORDER — FLUTICASONE PROPIONATE 50 MCG
1 SPRAY, SUSPENSION (ML) NASAL DAILY
Qty: 1 EACH | Refills: 3 | Status: SHIPPED | OUTPATIENT
Start: 2024-03-27

## 2024-03-27 NOTE — PROGRESS NOTES
HPI:    Patient ID: Xena Gallardo is a 33 year old female.    HPI  Pt presenting for routine physical exam. Denies any acute issues or recent illnesses. No significant chronic medical problems. Past medical/surgical history, family history, and social history were reviewed.     H/o GERD  Recent EGD notable for chronic gastritis  Still with daily nausea, taking PPI as instructed    H/o urgency-frequency syndrome  Followed by UroGyne    New hearing issues associated with Right ear pressure  Decreased hearing over the last few months  Asking other to repeat, admits to lip reading  Postnasal drip  Daily antihistamine without improvement  H/o tympanostomy due to recurrent AOM as a child    Mood stable, denies SH/SI/HI    Taking Dupixent with improvement      Review of Systems   A comprehensive 10 point review of systems was completed.  Pertinent positives and negatives noted in the the HPI.       Current Outpatient Medications   Medication Sig Dispense Refill    fluticasone propionate 50 MCG/ACT Nasal Suspension 1 spray by Nasal route daily. One spray per each nostril daily. 1 each 3    chlorhexidine gluconate 0.12 % Mouth/Throat Solution RINSE ONCE DAILY AS DIRECTED      clonazePAM 0.5 MG Oral Tab Take 1 tablet (0.5 mg total) by mouth daily as needed for Anxiety.      FLUoxetine HCl 40 MG Oral Cap Take 1 tablet (20 mg total) by mouth daily.      ondansetron (ZOFRAN) 8 MG tablet Take 1 tablet (8 mg total) by mouth every 8 (eight) hours as needed for Nausea. 30 tablet 3    pantoprazole 40 MG Oral Tab EC Take 1 tablet (40 mg total) by mouth before breakfast. 90 tablet 3    Dupilumab (DUPIXENT) 300 MG/2ML Subcutaneous Solution Pen-injector Inject 300 mg into the skin every 14 (fourteen) days. 2 each 6    ERGOCALCIFEROL 1.25 MG (51627 UT) Oral Cap TAKE 1 CAPSULE BY MOUTH ONE TIME PER WEEK 12 capsule 1     Allergies:No Known Allergies   Vitals:    03/27/24 0935   BP: 126/70   Pulse: 76   Temp: 97.5 °F (36.4 °C)   TempSrc:  Temporal   SpO2: 97%   Weight: (!) 348 lb 3.2 oz (157.9 kg)   Height: 5' 7\" (1.702 m)       Body mass index is 54.54 kg/m².   PHYSICAL EXAM:   Physical Exam  Vitals reviewed.   Constitutional:       General: She is not in acute distress.     Appearance: Normal appearance. She is well-developed.   HENT:      Head: Normocephalic and atraumatic.      Right Ear: Ear canal and external ear normal. A middle ear effusion is present. Tympanic membrane is scarred.      Left Ear: Tympanic membrane, ear canal and external ear normal.   Eyes:      Conjunctiva/sclera: Conjunctivae normal.   Neck:      Thyroid: No thyroid mass or thyroid tenderness.   Cardiovascular:      Rate and Rhythm: Normal rate and regular rhythm.      Pulses: Normal pulses.      Heart sounds: Normal heart sounds, S1 normal and S2 normal. No murmur heard.  Pulmonary:      Effort: Pulmonary effort is normal. No respiratory distress.      Breath sounds: Normal breath sounds. No wheezing, rhonchi or rales.   Abdominal:      General: Bowel sounds are normal.      Palpations: Abdomen is soft.      Tenderness: There is no abdominal tenderness. There is no guarding or rebound.   Musculoskeletal:      Cervical back: Normal range of motion and neck supple. No muscular tenderness.      Right lower leg: No edema.      Left lower leg: No edema.   Lymphadenopathy:      Cervical: No cervical adenopathy.   Skin:     General: Skin is warm and dry.      Coloration: Skin is not jaundiced.   Neurological:      General: No focal deficit present.      Mental Status: She is alert and oriented to person, place, and time. Mental status is at baseline.   Psychiatric:         Attention and Perception: Attention normal.         Mood and Affect: Mood normal.         Behavior: Behavior normal. Behavior is cooperative.         Cognition and Memory: Cognition normal.             ASSESSMENT/PLAN:   1. Well adult exam  Discussed preventative screenings  - Pap 1/2022  - will check labs as  below  - encouraged to continue diet/exercise for overall wellness  - follow-up with eye doctor annually  - follow-up with dentist every 6 months  - return yearly for physicals  - annual flu shot  - tetanus booster every 10yrs -- will check prior immunizations  - TSH W Reflex To Free T4; Future  - CBC, Platelet; No Differential; Future  - Comp Metabolic Panel (14); Future  - Hemoglobin A1C; Future  - Lipid Panel; Future  - Vitamin D; Future    2. Incidental lung nodule  Recent CT imaging reviewed, shows prior 2.7x1.2cm lesion now measures 2.1x1.2cm  Will recheck in 1yr as discussed  - CT CHEST (CPT=71250); Future    3. Right acute serous otitis media, recurrence not specified  - demonstrated how to administer Flonase medication  - avoid triggers as able  - increase fluid hydration and rest as tolerated  - to call with any questions or concerns  - fluticasone propionate 50 MCG/ACT Nasal Suspension; 1 spray by Nasal route daily. One spray per each nostril daily.  Dispense: 1 each; Refill: 3    4. Decreased hearing of right ear  As above  Follow-up with Audiology/ENT if no improvement  - fluticasone propionate 50 MCG/ACT Nasal Suspension; 1 spray by Nasal route daily. One spray per each nostril daily.  Dispense: 1 each; Refill: 3  - Audiology Referral - In Network  - ENT Referral - [facility])    Pt verbalized understanding and agrees with plan.    Orders Placed This Encounter   Procedures    TSH W Reflex To Free T4    CBC, Platelet; No Differential    Comp Metabolic Panel (14)    Hemoglobin A1C    Lipid Panel    Vitamin D       Meds This Visit:  Requested Prescriptions     Signed Prescriptions Disp Refills    fluticasone propionate 50 MCG/ACT Nasal Suspension 1 each 3     Si spray by Nasal route daily. One spray per each nostril daily.       Imaging & Referrals:  OP REFERRAL TO AUDIOLOGY  ENT - INTERNAL  CT CHEST (CPT=71250)         ID#[id number]

## 2024-04-04 ENCOUNTER — TELEPHONE (OUTPATIENT)
Dept: DERMATOLOGY CLINIC | Facility: CLINIC | Age: 34
End: 2024-04-04

## 2024-04-15 ENCOUNTER — OFFICE VISIT (OUTPATIENT)
Dept: OTOLARYNGOLOGY | Facility: CLINIC | Age: 34
End: 2024-04-15
Payer: COMMERCIAL

## 2024-04-15 ENCOUNTER — OFFICE VISIT (OUTPATIENT)
Dept: AUDIOLOGY | Facility: CLINIC | Age: 34
End: 2024-04-15

## 2024-04-15 DIAGNOSIS — H90.3 SENSORINEURAL HEARING LOSS (SNHL) OF BOTH EARS: Primary | ICD-10-CM

## 2024-04-15 DIAGNOSIS — H91.90 HEARING LOSS, UNSPECIFIED HEARING LOSS TYPE, UNSPECIFIED LATERALITY: Primary | ICD-10-CM

## 2024-04-15 DIAGNOSIS — H90.6 MIXED HEARING LOSS, BILATERAL: ICD-10-CM

## 2024-04-15 PROCEDURE — 99203 OFFICE O/P NEW LOW 30 MIN: CPT | Performed by: OTOLARYNGOLOGY

## 2024-04-15 PROCEDURE — 92557 COMPREHENSIVE HEARING TEST: CPT | Performed by: AUDIOLOGIST

## 2024-04-15 PROCEDURE — 92567 TYMPANOMETRY: CPT | Performed by: AUDIOLOGIST

## 2024-04-15 NOTE — PROGRESS NOTES
NEW PATIENT PROGRESS NOTE  OTOLOGY/OTOLARYNGOLOGY    REF MD:  No referring provider defined for this encounter.     PCP: Dinah Bui MD    CHIEF COMPLAINT:    Chief Complaint   Patient presents with    Ear Problem     Right ear pressure with pain   Difficulty hearing      HISTORY OF PRESENT ILLNESS: Xena Gallardo is a 33 year old female who presents for evaluation of right-sided hearing change. Patient has been experiencing decreased hearing over the last 4 months. There is also an associated ear pressure. She often has to ask others to repeat themselves, and she even has to lip read to know what others are saying. She has a history of tympanostomy due to recurrent AOM as a child. Was referred here by PCP for further evaluation. She has had a prior tonsillectomy and adenoidectomy. She is unsure of whether she has vertigo. Denies hypersensitivity to noise.       PAST MEDICAL HISTORY:    Past Medical History:    Cellulitis       PAST SURGICAL HISTORY:    Past Surgical History:   Procedure Laterality Date    Tonsillectomy Bilateral 01/01/2013       Current Outpatient Medications on File Prior to Visit   Medication Sig Dispense Refill    ergocalciferol 1.25 MG (85226 UT) Oral Cap Take 1 capsule (50,000 Units total) by mouth once a week. 24 capsule 0    fluticasone propionate 50 MCG/ACT Nasal Suspension 1 spray by Nasal route daily. One spray per each nostril daily. 1 each 3    chlorhexidine gluconate 0.12 % Mouth/Throat Solution RINSE ONCE DAILY AS DIRECTED      clonazePAM 0.5 MG Oral Tab Take 1 tablet (0.5 mg total) by mouth daily as needed for Anxiety.      FLUoxetine HCl 40 MG Oral Cap Take 1 tablet (20 mg total) by mouth daily.      ondansetron (ZOFRAN) 8 MG tablet Take 1 tablet (8 mg total) by mouth every 8 (eight) hours as needed for Nausea. 30 tablet 3    pantoprazole 40 MG Oral Tab EC Take 1 tablet (40 mg total) by mouth before breakfast. 90 tablet 3    Dupilumab (DUPIXENT) 300 MG/2ML Subcutaneous  Solution Pen-injector Inject 300 mg into the skin every 14 (fourteen) days. 2 each 6    ERGOCALCIFEROL 1.25 MG (66971 UT) Oral Cap TAKE 1 CAPSULE BY MOUTH ONE TIME PER WEEK 12 capsule 1     No current facility-administered medications on file prior to visit.       Allergies: No Known Allergies    SOCIAL HISTORY:    Social History     Tobacco Use    Smoking status: Never    Smokeless tobacco: Never   Substance Use Topics    Alcohol use: Not Currently       FAMILY HISTORY: Denies known family history of hearing loss, tinnitus, vertigo, or migraine.  Denies known family history of head and neck cancer, thyroid cancer, bleeding disorders.     REVIEW OF SYSTEMS:   Positives are in bold  Neuro: Headache, facial weakness, facial numbness, neck pain, vertigo  ENT: Hearing change, tinnitus, otorrhea, otalgia, aural fullness, ear pressure, vertigo, imbalance  Sinus pressure, rhinorrhea, congestion, facial pain, jaw pain, dysphagia, odynophagia, sore throat, voice changes, shortness of breath    EXAMINATION:  I washed my hands with an alcohol-based hand gel prior to examination  Constitutional:   --Vitals: Last menstrual period 02/26/2024.  --General: no apparent distress, well-developed, conversant  Psych: affect pleasant and appropriate for age, alert and oriented  Neuro: Facial movement normal bilateral  Eyes: Pupils equal, symmetric and reactive to light.  Extra-ocular muscles intact  Respiratory: No stridor, stertor or increased work of breathing  ENT:  --Ear: The bilateral ears were examined under binocular microscopy  Right ear microscopic exam:  Pinna: Normal, no lesions or masses.  Mastoid: Nontender on palpation.   External auditory canal: Clear, no masses or lesions.  Tympanic membrane: Intact, no lesions, normal landmarks. Anterior-superior posterior-inferior myringosclerosis   Middle ear: Aerated.      Left ear microscopic exam:  Pinna: Normal, no lesions or masses.  Mastoid: Nontender on palpation.   External  auditory canal: Clear, no masses or lesions.  Tympanic membrane: Inferior monomer. Intact, no lesions, normal landmarks.  Middle ear: Aerated.    Latest Audiogram Result (Hz) Exam performed: 4/15/2024 9:40 AM Last edited by Lulu Burks MS, CCC-A on 4/15/2024 9:54 AM        125 250  1500 2000 3000 4000 6000 8000    Right air:  30 25  25  15  20  10    Left air:  25 25  20  30  20  20    Right mastoid bone:       10  15      Left mastoid bone:   15  10          Right mastoid bone (masked):   15 10           Left mastoid bone (masked):       10           Reliability:  Good    Transducer:  Inserts    Technique:  Conventional Audiometry    Comments:            Latest Speech Audiometry  Last edited by Lulu Burks MS, CCC-A on 4/15/2024 9:53 AM       Ear Method PTA SAT SRT [facility] Test/list Score (%) Intensity Mask/noise Notes    right    15    100 55      left    15    100 55                    Latest Tympanogram Result       Probe Tone (Hz): 226 Exam performed: 4/15/2024 9:40 AM Last edited by Lulu Burks MS, CCC-A on 4/15/2024 9:54 AM      Tympanograms  These were drawn by a user, not generated from device data      Right Ear Left Ear                     Right Ear Left Ear    Tympanogram type: Type A Type A    Canal volume (mL): 2.9 2.4    Peak pressure (daPa): -15 -5    Peak amplitude (mL): 1.6 2.7    Tympanogram width (daPa):        Comments:                    Latest Audiogram and Tympanogram Result Text  Last edited by Lulu Burks MS, CCC-A on 4/15/2024  9:54 AM      Study Result                 Narrative & Impression  Mild mixed hearing loss bilaterally.    Excellent word recognition ability bilaterally at 55 dBHL.    Normal tymps.    F/u with Dr. Holley today.                    ASSESSMENT/PLAN:  Xena Gallardo is a 33 year old female with     ICD-10-CM   1. Sensorineural hearing loss (SNHL) of both ears  H90.3        IMPRESSION:  Bilateral mild conductive hearing loss, likely related to  prior history of ear infections    PLAN:  -Audiogram reviewed with patient   -Recommend optimizing best hearing practices - face to face communication, minimizing background noise  -Consider the role of attention in hearing  -Discussed the less likely, but possible situations of central processing disorder and auditory neuropathy. If you would like further testing for these conditions, please let me know and we will help you get in contact with the proper providers to get tested.    -Recommend trial of hearing aids, as patient is noticing significant difficulty hearing in social situations   -Follow up for recommended yearly audiograms    Situation reviewed with the patient in detail.    Attention: This note has been scribed by Bev Bolden under the supervision of Henrry North MD.     Henrry North MD  Otology/Otolaryngology  [address] [place] [id number]  Phone [phone]  Fax [phone]      I have personally performed the services described in this documentation. All medical record entries made by the scribe were at my direction and in my presence. I have reviewed the chart and agree that the medical record reflects my personal performance and is accurate and complete.

## 2024-06-03 NOTE — TELEPHONE ENCOUNTER
Refill Request for medication(s):     Last Office Visit: 10/9/23    Last Refill: 10/9/23    Pharmacy, Dosage verified: yes    Condition Update (if applicable):     Rx pended and sent to provider for approval, please advise. Thank You!

## 2024-06-04 RX ORDER — DUPILUMAB 300 MG/2ML
300 INJECTION, SOLUTION SUBCUTANEOUS
Qty: 4 ML | Refills: 2 | Status: SHIPPED | OUTPATIENT
Start: 2024-06-04

## 2024-06-04 NOTE — TELEPHONE ENCOUNTER
Okay refill patient has been doing well on current regimen at last visit.  Plan follow-up in the fall.  Please have patient schedule follow-up appointment -for 3 to 4 months out.

## 2024-08-19 NOTE — TELEPHONE ENCOUNTER
Refill Request for medication(s):     Last Office Visit: 10/9/2023    Last Refill:  06/04/2024    Pharmacy, Dosage verified: yes    Condition Update (if applicable):     Rx pended and sent to provider for approval, please advise. Thank You!

## 2024-08-21 RX ORDER — DUPILUMAB 300 MG/2ML
300 INJECTION, SOLUTION SUBCUTANEOUS
Qty: 4 ML | Refills: 2 | Status: SHIPPED | OUTPATIENT
Start: 2024-08-21

## 2024-10-10 DIAGNOSIS — H91.91 DECREASED HEARING OF RIGHT EAR: ICD-10-CM

## 2024-10-10 DIAGNOSIS — H65.01 RIGHT ACUTE SEROUS OTITIS MEDIA, RECURRENCE NOT SPECIFIED: ICD-10-CM

## 2024-10-10 DIAGNOSIS — R10.84 GENERALIZED ABDOMINAL PAIN: ICD-10-CM

## 2024-10-10 DIAGNOSIS — R06.02 SHORTNESS OF BREATH: ICD-10-CM

## 2024-10-14 RX ORDER — PANTOPRAZOLE SODIUM 40 MG/1
40 TABLET, DELAYED RELEASE ORAL
Qty: 90 TABLET | Refills: 3 | Status: SHIPPED | OUTPATIENT
Start: 2024-10-14

## 2024-10-14 RX ORDER — PANTOPRAZOLE SODIUM 40 MG/1
40 TABLET, DELAYED RELEASE ORAL
Qty: 90 TABLET | Refills: 3 | OUTPATIENT
Start: 2024-10-14

## 2024-10-14 RX ORDER — FLUTICASONE PROPIONATE 50 MCG
1 SPRAY, SUSPENSION (ML) NASAL DAILY
Qty: 48 G | Refills: 3 | Status: SHIPPED | OUTPATIENT
Start: 2024-10-14

## 2024-10-14 NOTE — TELEPHONE ENCOUNTER
Refill Per Protocol     Requested Prescriptions   Pending Prescriptions Disp Refills    pantoprazole 40 MG Oral Tab EC 90 tablet 3     Sig: Take 1 tablet (40 mg total) by mouth before breakfast.       Gastrointestional Medication Protocol Passed - 10/14/2024 10:01 AM        Passed - In person appointment or virtual visit in the past 12 mos or appointment in next 3 mos     Recent Outpatient Visits              6 months ago Hearing loss, unspecified hearing loss type, unspecified laterality    [facility]    Office Visit    6 months ago Sensorineural hearing loss (SNHL) of both ears    [facility] Henrry Resendiz MD    Office Visit    6 months ago Well adult exam    [facility] HaoDinah MD    Office Visit    9 months ago Urgency-frequency syndrome    [facility] Salma Jimenes MD    Office Visit    10 months ago Generalized abdominal pain    [facility] Rhiannon Veras MD    Office Visit                        fluticasone propionate 50 MCG/ACT Nasal Suspension 1 each 3     Si spray by Nasal route daily. One spray per each nostril daily.       Allergy Medication Protocol Passed - 10/14/2024 10:01 AM        Passed - In person appointment or virtual visit in the past 12 mos or appointment in next 3 mos     Recent Outpatient Visits              6 months ago Hearing loss, unspecified hearing loss type, unspecified laterality    [facility]    Office Visit    6 months ago Sensorineural hearing loss (SNHL) of both ears    [facility] Henrry Resendiz MD    Office Visit    6 months ago Well adult exam    [facility] [place]    Office Visit    9 months ago Urgency-frequency syndrome    [facility]ro[facility] Salma Jimenes MD    Office Visit    10 months ago Generalized abdominal pain    [facility], Rhiannon Goff MD    Office Visit                               Recent Outpatient Visits              6 months ago Hearing loss, unspecified hearing loss type, unspecified laterality    [facility] [place]    Office Visit    6 months ago Sensorineural hearing loss (SNHL) of both ears    [facility]    Office Visit    6 months ago [facility] adult exam    [facility]    Office Visit    9 months ago Urgency-frequency syndrome    [facility] Salma Jimenes MD    Office Visit    10 months ago Generalized abdominal pain    [facility] [place]    Office Visit

## 2024-10-14 NOTE — TELEPHONE ENCOUNTER
Please review. Protocol Failed; No Protocol    Requested Prescriptions   Pending Prescriptions Disp Refills    ALBUTEROL 108 (90 Base) MCG/ACT Inhalation Aero Soln [Pharmacy Med Name: ALBUTEROL HFA INH (200 PUFFS) 8.5GM] 8.5 g 0     Sig: INHALE 2 PUFFS BY MOUTH EVERY 4 HOURS AS NEEDED FOR WHEEZING OR SHORTNESS OF BREATH( CHEST TIGHTNESS)       Asthma & COPD Medication Protocol Failed - 10/14/2024 11:06 AM        Failed - Asthma Action Score greater than or equal to 20        Failed - Appointment in past 6 or next 3 months      Recent Outpatient Visits              6 months ago Hearing loss, unspecified hearing loss type, unspecified laterality    [facility] [place] Lulu Burks MS, CCC-A    Office Visit    6 months ago Sensorineural hearing loss (SNHL) of both ears    [facility] Henrry Resendiz MD    Office Visit    6 months ago Well adult exam    [facility] [place] Dinah Bui MD    Office Visit    9 months ago Urgency-frequency syndrome    [facility] Salma Jimenes MD    Office Visit    10 months ago Generalized abdominal pain    [facility] Rhiannon Veras MD    Office Visit                      Failed - AAP/ACT given in last 12 months     No data recorded  No data recorded  No data recorded  No data recorded           Refused Prescriptions Disp Refills    PANTOPRAZOLE 40 MG Oral Tab EC [Pharmacy Med Name: PANTOPRAZOLE 40MG TABLETS] 90 tablet 3     Sig: TAKE 1 TABLET BY MOUTH BEFORE BREAKFAST       Gastrointestional Medication Protocol Passed - 10/14/2024 11:06 AM        Passed - In person appointment or virtual visit in the past 12 mos or appointment in next 3 mos     Recent Outpatient Visits              6 months ago Hearing loss, unspecified hearing loss type,  unspecified laterality    [facility], Lulu Lewis, MS, CCC-A    Office Visit    6 months ago Sensorineural hearing loss (SNHL) of both ears    [facility] Henrry Fu MD    Office Visit    6 months ago Well adult exam    [facility], Dinah Almeida MD    Office Visit    9 months ago Urgency-frequency syndrome    [facility]gy[facility] Salma Jimenes MD    Office Visit    10 months ago Generalized abdominal pain    [facility], Rhiannon Goff MD    Office Visit                               Recent Outpatient Visits              6 months ago Hearing loss, unspecified hearing loss type, unspecified laterality    [facility] [place]-A    Office Visit    6 months ago Sensorineural hearing loss (SNHL) of both ears    [facility], Henrry Fu MD    Office Visit    6 months ago Well adult exam    [facility], Dinah Almeida MD    Office Visit    9 months ago Urgency-frequency syndrome    [facility] Salma Jimenes MD    Office Visit    10 months ago Generalized abdominal pain    [facility] [place]    Office Visit

## 2024-10-15 RX ORDER — ALBUTEROL SULFATE 90 UG/1
INHALANT RESPIRATORY (INHALATION)
Qty: 8.5 G | Refills: 1 | Status: SHIPPED | OUTPATIENT
Start: 2024-10-15

## 2024-11-01 RX ORDER — DUPILUMAB 300 MG/2ML
300 INJECTION, SOLUTION SUBCUTANEOUS
Qty: 4 ML | Refills: 0 | Status: SHIPPED | OUTPATIENT
Start: 2024-11-01

## 2024-11-01 NOTE — TELEPHONE ENCOUNTER
Refill Request for medication(s): Dupilumab (DUPIXENT) 300 MG/2ML Subcutaneous Solution Pen-injector     Last Office Visit: 10/09/23    Last Refill: 08/21/24    Pharmacy, Dosage verified:   [facility] - [address] [phone], [phone]     [facility] (if applicable[facility] for [facility] update and appt reminder    Rx pended and sent to provider for approval, please advise. Thank You!

## 2024-12-09 RX ORDER — DUPILUMAB 300 MG/2ML
300 INJECTION, SOLUTION SUBCUTANEOUS
Qty: 4 ML | Refills: 1 | Status: SHIPPED | OUTPATIENT
Start: 2024-12-09

## 2024-12-09 NOTE — TELEPHONE ENCOUNTER
Refill Request for medication(s):     Last Office Visit: 10/9/2023, upcoming appt 1/20/25    Last Refill: 11/01/2024    Pharmacy, Dosage verified: yes    Condition Update (if applicable):     Rx pended and sent to provider for approval, please advise. Thank You!

## 2024-12-17 ENCOUNTER — HOSPITAL ENCOUNTER (OUTPATIENT)
Dept: CT IMAGING | Facility: HOSPITAL | Age: 34
Discharge: HOME | End: 2024-12-17
Attending: STUDENT IN AN ORGANIZED HEALTH CARE EDUCATION/TRAINING PROGRAM
Payer: COMMERCIAL

## 2024-12-17 DIAGNOSIS — R91.1 INCIDENTAL LUNG NODULE: ICD-10-CM

## 2024-12-17 PROCEDURE — 71250 CT THORAX DX C-: CPT | Performed by: STUDENT IN AN ORGANIZED HEALTH CARE EDUCATION/TRAINING PROGRAM

## 2024-12-18 ENCOUNTER — PATIENT MESSAGE (OUTPATIENT)
Dept: FAMILY MEDICINE CLINIC | Facility: CLINIC | Age: 34
End: 2024-12-18

## 2024-12-19 NOTE — TELEPHONE ENCOUNTER
Dr. Bui please advise, patient has questing regarding her CT chest. See my chart message below:    Thank you for your message!      I was looking at my last scan and to me it seemed the sizing changed? The last scan said the Right lower lobe was a  2.1 x 1.2 cm nodule and now it is saying 1.0 x 2.9 cm nodule in the right lower lobe. Obviously I am not the doctor and do not understand what the sizing means but wanted to clarify.      I also am curious about the comments about my bones? Is this something I should be concerned about?     Thank you in advance.       
Social or environmental circumstances

## 2025-01-20 ENCOUNTER — OFFICE VISIT (OUTPATIENT)
Dept: DERMATOLOGY CLINIC | Facility: CLINIC | Age: 35
End: 2025-01-20
Payer: COMMERCIAL

## 2025-01-20 DIAGNOSIS — D23.9 BENIGN NEOPLASM OF SKIN, UNSPECIFIED LOCATION: ICD-10-CM

## 2025-01-20 DIAGNOSIS — D22.9 MULTIPLE NEVI: ICD-10-CM

## 2025-01-20 DIAGNOSIS — Z51.81 MEDICATION MONITORING ENCOUNTER: ICD-10-CM

## 2025-01-20 DIAGNOSIS — Z80.8 FAMILY HISTORY OF MELANOMA: ICD-10-CM

## 2025-01-20 DIAGNOSIS — Z79.620 LONG TERM (CURRENT) USE OF IMMUNOSUPPRESSIVE BIOLOGIC: ICD-10-CM

## 2025-01-20 DIAGNOSIS — L20.89 OTHER ATOPIC DERMATITIS: Primary | ICD-10-CM

## 2025-01-20 DIAGNOSIS — Z12.83 SKIN CANCER SCREENING: ICD-10-CM

## 2025-01-20 PROCEDURE — 99214 OFFICE O/P EST MOD 30 MIN: CPT | Performed by: DERMATOLOGY

## 2025-01-20 RX ORDER — DUPILUMAB 300 MG/2ML
300 INJECTION, SOLUTION SUBCUTANEOUS
Qty: 4 ML | Refills: 9 | Status: SHIPPED | OUTPATIENT
Start: 2025-01-20

## 2025-01-20 RX ORDER — BUPROPION HYDROCHLORIDE 150 MG/1
300 TABLET, EXTENDED RELEASE ORAL EVERY MORNING
COMMUNITY
Start: 2024-12-17

## 2025-01-20 RX ORDER — TRIAMCINOLONE ACETONIDE 1 MG/G
1 OINTMENT TOPICAL 2 TIMES DAILY
Qty: 80 G | Refills: 0 | Status: SHIPPED | OUTPATIENT
Start: 2025-01-20

## 2025-01-20 NOTE — PROGRESS NOTES
The following individual(s) verbally consented to be recorded using ambient AI listening technology and understand that they can each withdraw their consent to this listening technology at any point by asking the clinician to turn off or pause the recording: Xena Gallardo

## 2025-01-21 NOTE — PROGRESS NOTES
Xena Gallardo is a 34 year old female.    Chief Complaint   Patient presents with    Dermatitis Atopic     Biologic: Dupixent  Previous biologics used: None  Signs and symptoms: Redness, itching, blistering hives, and tenderness.   Side effects: None  Last Quant gold: Not needed  Affective: stable     Upper Body Exam     Last office visit: 10/09/2023  Type of check: Upper body  Lesions of concern: None   Symptoms for lesions: None  Personal hx of skin cancer: None  Family hx of Skin cancer: Melanoma (Maternal Grandfather)   Sun Screen use: Yes             Patient has no known allergies.  Current Outpatient Medications   Medication Sig Dispense Refill    buPROPion  MG Oral Tablet 12 Hr Take 2 tablets (300 mg total) by mouth every morning.      Dupilumab (DUPIXENT) 300 MG/2ML Subcutaneous Solution Auto-injector Inject 300 mg into the skin every 14 (fourteen) days. 4 mL 9    triamcinolone 0.1 % External Ointment Apply 1 Application topically 2 (two) times daily. 80 g 0    albuterol 108 (90 Base) MCG/ACT Inhalation Aero Soln INHALE 2 PUFFS BY MOUTH EVERY 4 HOURS AS NEEDED FOR WHEEZING OR SHORTNESS OF BREATH( CHEST TIGHTNESS) 8.5 g 1    pantoprazole 40 MG Oral Tab EC Take 1 tablet (40 mg total) by mouth before breakfast. 90 tablet 3    fluticasone propionate 50 MCG/ACT Nasal Suspension 1 spray by Nasal route daily. One spray per each nostril daily. 48 g 3    ergocalciferol 1.25 MG (86167 UT) Oral Cap Take 1 capsule (50,000 Units total) by mouth once a week. 24 capsule 0    chlorhexidine gluconate 0.12 % Mouth/Throat Solution RINSE ONCE DAILY AS DIRECTED      clonazePAM 0.5 MG Oral Tab Take 1 tablet (0.5 mg total) by mouth daily as needed for Anxiety.      FLUoxetine HCl 40 MG Oral Cap Take 3 tablets (60 mg total) by mouth daily.      ondansetron (ZOFRAN) 8 MG tablet Take 1 tablet (8 mg total) by mouth every 8 (eight) hours as needed for Nausea. 30 tablet 3    ERGOCALCIFEROL 1.25 MG (38551 UT) Oral Cap TAKE 1  CAPSULE BY MOUTH ONE TIME PER WEEK (Patient not taking: Reported on 1/20/2025) 12 capsule 1      Past Medical History:    Cellulitis      Social History:  Social History     Socioeconomic History    Marital status: Life Partner   Tobacco Use    Smoking status: Never    Smokeless tobacco: Never   Vaping Use    Vaping status: Never Used   Substance and Sexual Activity    Alcohol use: Not Currently    Drug use: Yes     Types: Cannabis     Comment: occ   Other Topics Concern    Breast feeding No    Reaction to local anesthetic No    Pt has a pacemaker No    Pt has a defibrillator No     Social Drivers of Health     Financial Resource Strain: Low Risk  (4/23/2024)    Received from [facility]    Overall Financial Resource Strain (CARDIA)     Difficulty of Paying Living Expenses: Not very hard   Food Insecurity: No Food Insecurity (4/23/2024)    Received from [facility]    Hunger Vital Sign     Worried About Running Out of Food in the Last Year: Never true     Ran Out of Food in the Last Year: Never true   Transportation Needs: No Transportation Needs (4/23/2024)    Received from [facility]    PRAPARE - Transportation     Lack of Transportation (Medical): No     Lack of Transportation (Non-Medical): No   Housing Stability: Low Risk  (4/23/2024)    Received from [facility]    Housing Stability Vital Sign     Unable to Pay for Housing in the Last Year: No     Number of Places Lived in the Last Year: 1     Unstable Housing in the Last Year: No                 Current Outpatient Medications   Medication Sig Dispense Refill    buPROPion  MG Oral Tablet 12 Hr Take 2 tablets (300 mg total) by mouth every morning.      Dupilumab (DUPIXENT) 300 MG/2ML Subcutaneous Solution Auto-injector Inject 300 mg into the skin every 14 (fourteen) days. 4 mL 9    triamcinolone 0.1 % External Ointment Apply 1 Application topically 2 (two) times daily. 80  g 0    albuterol 108 (90 Base) MCG/ACT Inhalation Aero Soln INHALE 2 PUFFS BY MOUTH EVERY 4 HOURS AS NEEDED FOR WHEEZING OR SHORTNESS OF BREATH( CHEST TIGHTNESS) 8.5 g 1    pantoprazole 40 MG Oral Tab EC Take 1 tablet (40 mg total) by mouth before breakfast. 90 tablet 3    fluticasone propionate 50 MCG/ACT Nasal Suspension 1 spray by Nasal route daily. One spray per each nostril daily. 48 g 3    ergocalciferol 1.25 MG (97183 UT) Oral Cap Take 1 capsule (50,000 Units total) by mouth once a week. 24 capsule 0    chlorhexidine gluconate 0.12 % Mouth/Throat Solution RINSE ONCE DAILY AS DIRECTED      clonazePAM 0.5 MG Oral Tab Take 1 tablet (0.5 mg total) by mouth daily as needed for Anxiety.      FLUoxetine HCl 40 MG Oral Cap Take 3 tablets (60 mg total) by mouth daily.      ondansetron (ZOFRAN) 8 MG tablet Take 1 tablet (8 mg total) by mouth every 8 (eight) hours as needed for Nausea. 30 tablet 3    ERGOCALCIFEROL 1.25 MG (17624 UT) Oral Cap TAKE 1 CAPSULE BY MOUTH ONE TIME PER WEEK (Patient not taking: Reported on 1/20/2025) 12 capsule 1     Allergies:   Allergies[1]    Past Medical History:    Cellulitis     Past Surgical History:   Procedure Laterality Date    Tonsillectomy Bilateral 01/01/2013     Social History     Socioeconomic History    Marital status: Life Partner     Spouse name: Not on file    Number of children: Not on file    Years of education: Not on file    Highest education level: Not on file   Occupational History    Not on file   Tobacco Use    Smoking status: Never    Smokeless tobacco: Never   Vaping Use    Vaping status: Never Used   Substance and Sexual Activity    Alcohol use: Not Currently    Drug use: Yes     Types: Cannabis     Comment: occ    Sexual activity: Not on file   Other Topics Concern    Grew up on a farm Not Asked    History of tanning Not Asked    Outdoor occupation Not Asked    Breast feeding No    Reaction to local anesthetic No    Pt has a pacemaker No    Pt has a defibrillator  No   Social History Narrative    Not on file     Social Drivers of Health     Financial Resource Strain: Low Risk  (4/23/2024)    Received from [facility]    Overall Financial Resource Strain (CARDIA)     Difficulty of Paying Living Expenses: Not very hard   Food Insecurity: No Food Insecurity (4/23/2024)    Received from [facility]    Hunger Vital Sign     Worried About Running Out of Food in the Last Year: Never true     Ran Out of Food in the Last Year: Never true   Transportation Needs: No Transportation Needs (4/23/2024)    Received from [facility]    PRAPARE - Transportation     Lack of Transportation (Medical): No     Lack of Transportation (Non-Medical): No   Physical Activity: Not on file   Stress: Not on file   Social Connections: Not on file   Housing Stability: Low Risk  (4/23/2024)    Received from [facility]    Housing Stability Vital Sign     Unable to Pay for Housing in the Last Year: No     Number of Places Lived in the Last Year: 1     Unstable Housing in the Last Year: No     Family History   Problem Relation Age of Onset    Breast Cancer Mother         s/p b/l mastectomy, neg BRCA    Cancer Maternal Grandmother         lung    Cancer Paternal Grandmother         ovarian, uterine                      HPI :      Chief Complaint   Patient presents with    Dermatitis Atopic     Biologic: Dupixent  Previous biologics used: None  Signs and symptoms: Redness, itching, blistering hives, and tenderness.   Side effects: None  Last Quant gold: Not needed  Affective: stable     Upper Body Exam     Last office visit: 10/09/2023  Type of check: Upper body  Lesions of concern: None   Symptoms for lesions: None  Personal hx of skin cancer: None  Family hx of Skin cancer: Melanoma (Maternal Grandfather)   Sun Screen use: Yes       History of Present Illness  The patient, on Degrexant for severe esophageal dermatitis, reports a  significant improvement in her condition. She notes that her hands are slightly irritated, which she attributes to the weather, but she has not experienced her typical blistering. Occasional blisters do appear, but they are not as frequent or severe as before. The patient has been using Eucerin for topical relief, but reports that her fingers have been extra dry in the past few weeks.    The patient also mentions that she has a history of skin infections, including a staph infection on her hip and an infection in her hands. She expresses interest in getting a tattoo, but is concerned about potential complications due to her skin condition and history of infections.    The patient is satisfied with the effects of Dupixent, describing it as \"life changing\" and \"life saving.\" She has not tried any new medications and has no plans to switch from Dupixent, given its effectiveness and safety profile. She is aware of other treatment options, such as RAVI inhibitors, but expresses no interest in them due to the potential side effects and the need for regular lab tests.    Patient presents with concerns above.    No personal   history of skin cancer    Family history of melanoma in maternal grandmother    Past notes/ records and appropriate/relevant lab results including pathology and past body maps reviewed. Updated and new information noted in current visit.       ROS:    Denies any other systemic complaints.  No fevers, chills, night sweats, sensitivity to the sun, deeper lumps or bumps.  No other skin complaints.  History, medications, allergies as noted.    Physical examination: Patient  well-developed well-nourished, alert oriented in no acute distress.  Exam of involved, appropriate areas of skin performed,  Remarkable for lesions as noted   Physical Exam  SKIN: Numerous freckles present.  Extensive nevi  No clinically atypical lesions at this time    See map for details     ASSESSMENT AND PLAN:     Encounter Diagnoses    Name Primary?    Other atopic dermatitis Yes    Medication monitoring encounter     Multiple nevi     Skin cancer screening     Long term (current) use of immunosuppressive biologic     Benign neoplasm of skin, unspecified location     Family history of melanoma      Meds & Refills for this Visit:   Requested Prescriptions     Signed Prescriptions Disp Refills    Dupilumab (DUPIXENT) 300 MG/2ML Subcutaneous Solution Auto-injector 4 mL 9     Sig: Inject 300 mg into the skin every 14 (fourteen) days.    triamcinolone 0.1 % External Ointment 80 g 0     Sig: Apply 1 Application topically 2 (two) times daily.       No orders of the defined types were placed in this encounter.    Assessment / plan:    Assessment & Plan  Atopic dermatitis longstanding    Dupixent is effective with significant improvement. Occasional dryness and irritation around fingers, likely due to cold weather. No blistering or severe symptoms. Uses Eucerin for management. Discussed triamcinolone ointment for nighttime use, noting its stronger and greasier formulation may be beneficial.  - Prescribe triamcinolone ointment for nighttime use.    Dupixent Therapy for Severe Eczema  Significant improvement with Dupixent. No indication to switch to RAVI inhibitors due to higher side effect profile and need for frequent lab monitoring. Dupixent has a robust safety profile, including in older adults.  - Continue Dupixent therapy.    Patient with more xerotic changes on the hands with winter weather, work triamcinolone ointment as needed overall has had minimal flareups on Dupixent      Tattoo Consideration with atopic dermatitis on Dupixent  Inquired about tattoo safety given eczema and skin infection history. No direct contraindication with Dupixent but advised caution due to family history of melanoma and potential allergic reactions to ink. Recommended starting with a small tattoo to monitor healing and avoiding tattoos over moles. Emphasized reputable  tattoo artists should not tattoo over moles.  - Advise starting with a small tattoo to monitor healing.  - Recommend avoiding tattoos over moles and ensuring reputable .    Continue careful monitoring of pigmented lesions with family history of melanoma    Benign-appearing nevi, no atypical features on dermoscopy reassurance given monitor.  Extensive nevi    Few waxy tan keratotic papules lesions in areas of concern as noted reassurance given.  Benign nature discussed.  Possibility of cryo, alphahydroxy acids over-the-counter retinol's discussed.    No other susupicious lesions on todays  exam.    Monitor for new or changing lesions. Signs and symptoms of skin cancer, ABCDE's of melanoma ( additional information available at AAD.org, skincancer.org) Encourage Sunscreen (broad-spectrum, ideally mineral-based-UVA/UVB -SPF 30 or higher) use encouraged, sun protection/sun protective clothing, self exams reviewed Followup as noted RTC ---routine checkup 6 mos -one year or p.r.n.    Encounter Times   Including precharting, reviewing chart, prior notes obtaining history: 10 minutes, medical exam :10 minutes, notes on body map, plan, counseling 10minutes My total time spent caring for the patient on the day of the encounter: 30 minutes     The patient indicates understanding of these issues and agrees to the plan.  The patient is asked to return as noted in follow-up/ above.    This note was generated using Dragon voice recognition software.  Please contact me regarding any confusion resulting from errors in recognition..  Note to patient and family: The 21st Century Cures Act makes medical notes like these available to patients. However, be advised this is a medical document. It is intended as [id number]-clrs communication and monitoring of a patient's care needs. It is written in medical language and may contain abbreviations or verbiage that are unfamiliar. It may appear blunt or direct. Medical documents are  intended to carry relevant information, facts as evident and the clinical opinion of the practitioner.      No orders of the defined types were placed in this encounter.          No diagnosis found.    No orders of the defined types were placed in this encounter.      Results From Past 48 Hours:  No results found for this or any previous visit (from the past 48 hours).    Meds This Visit:      Imaging Orders:  None     Referral Orders:  No orders of the defined types were placed in this encounter.                 [1] No Known Allergies

## 2025-02-28 ENCOUNTER — MED REC SCAN ONLY (OUTPATIENT)
Dept: DERMATOLOGY CLINIC | Facility: CLINIC | Age: 35
End: 2025-02-28

## 2025-02-28 ENCOUNTER — TELEPHONE (OUTPATIENT)
Dept: DERMATOLOGY CLINIC | Facility: CLINIC | Age: 35
End: 2025-02-28

## 2025-02-28 NOTE — TELEPHONE ENCOUNTER
PA renewal submitted via [facility] and immediately approved.  Pt may continue Dupixent without any issues.

## 2025-03-24 ENCOUNTER — TELEPHONE (OUTPATIENT)
Dept: DERMATOLOGY CLINIC | Facility: CLINIC | Age: 35
End: 2025-03-24

## 2025-03-24 NOTE — TELEPHONE ENCOUNTER
Dupilumab (DUPIXENT) 300 MG/2ML Subcutaneous Solution Auto-injector, Inject 300 mg into the skin every 14 (fourteen) days., Disp: 4 mL, Rfl: 9    Key: [id number]

## 2025-04-14 ENCOUNTER — OFFICE VISIT (OUTPATIENT)
Dept: FAMILY MEDICINE CLINIC | Facility: CLINIC | Age: 35
End: 2025-04-14

## 2025-04-14 ENCOUNTER — LAB ENCOUNTER (OUTPATIENT)
Dept: LAB | Age: 35
End: 2025-04-14
Payer: COMMERCIAL

## 2025-04-14 VITALS
HEART RATE: 84 BPM | SYSTOLIC BLOOD PRESSURE: 128 MMHG | HEIGHT: 67 IN | DIASTOLIC BLOOD PRESSURE: 82 MMHG | WEIGHT: 293 LBS | BODY MASS INDEX: 45.99 KG/M2 | OXYGEN SATURATION: 96 % | TEMPERATURE: 98 F

## 2025-04-14 DIAGNOSIS — E55.9 VITAMIN D DEFICIENCY: ICD-10-CM

## 2025-04-14 DIAGNOSIS — N92.0 MENORRHAGIA WITH REGULAR CYCLE: ICD-10-CM

## 2025-04-14 DIAGNOSIS — F41.9 ANXIETY AND DEPRESSION: ICD-10-CM

## 2025-04-14 DIAGNOSIS — Z00.00 ROUTINE PHYSICAL EXAMINATION: ICD-10-CM

## 2025-04-14 DIAGNOSIS — R11.0 NAUSEA: ICD-10-CM

## 2025-04-14 DIAGNOSIS — L98.9 SKIN LESION OF HAND: ICD-10-CM

## 2025-04-14 DIAGNOSIS — Z00.00 ROUTINE PHYSICAL EXAMINATION: Primary | ICD-10-CM

## 2025-04-14 DIAGNOSIS — K59.00 CONSTIPATION, UNSPECIFIED CONSTIPATION TYPE: ICD-10-CM

## 2025-04-14 DIAGNOSIS — R06.02 SHORTNESS OF BREATH: ICD-10-CM

## 2025-04-14 DIAGNOSIS — F32.A ANXIETY AND DEPRESSION: ICD-10-CM

## 2025-04-14 LAB
ALBUMIN SERPL-MCNC: 4.3 G/DL (ref 3.2–4.8)
ALBUMIN/GLOB SERPL: 1.8 {RATIO} (ref 1–2)
ALP LIVER SERPL-CCNC: 83 U/L (ref 37–98)
ALT SERPL-CCNC: 18 U/L (ref 10–49)
ANION GAP SERPL CALC-SCNC: 8 MMOL/L (ref 0–18)
AST SERPL-CCNC: 17 U/L (ref ?–34)
BASOPHILS # BLD AUTO: 0.04 X10(3) UL (ref 0–0.2)
BASOPHILS NFR BLD AUTO: 0.6 %
BILIRUB SERPL-MCNC: 0.6 MG/DL (ref 0.3–1.2)
BUN BLD-MCNC: 12 MG/DL (ref 9–23)
BUN/CREAT SERPL: 17.6 (ref 10–20)
CALCIUM BLD-MCNC: 8.7 MG/DL (ref 8.7–10.4)
CHLORIDE SERPL-SCNC: 107 MMOL/L (ref 98–112)
CHOLEST SERPL-MCNC: 138 MG/DL (ref ?–200)
CO2 SERPL-SCNC: 23 MMOL/L (ref 21–32)
CREAT BLD-MCNC: 0.68 MG/DL (ref 0.55–1.02)
DEPRECATED HBV CORE AB SER IA-ACNC: 10 NG/ML (ref 50–306)
DEPRECATED RDW RBC AUTO: 44.2 FL (ref 35.1–46.3)
EGFRCR SERPLBLD CKD-EPI 2021: 119 ML/MIN/1.73M2 (ref 60–?)
EOSINOPHIL # BLD AUTO: 0.07 X10(3) UL (ref 0–0.7)
EOSINOPHIL NFR BLD AUTO: 1 %
ERYTHROCYTE [DISTWIDTH] IN BLOOD BY AUTOMATED COUNT: 13.6 % (ref 11–15)
FASTING PATIENT LIPID ANSWER: YES
FASTING STATUS PATIENT QL REPORTED: YES
GLOBULIN PLAS-MCNC: 2.4 G/DL (ref 2–3.5)
GLUCOSE BLD-MCNC: 87 MG/DL (ref 70–99)
HCT VFR BLD AUTO: 38.6 % (ref 35–48)
HDLC SERPL-MCNC: 58 MG/DL (ref 40–59)
HGB BLD-MCNC: 12.7 G/DL (ref 12–16)
IMM GRANULOCYTES # BLD AUTO: 0.02 X10(3) UL (ref 0–1)
IMM GRANULOCYTES NFR BLD: 0.3 %
IRON SATN MFR SERPL: 28 % (ref 15–50)
IRON SERPL-MCNC: 96 UG/DL (ref 50–170)
LDLC SERPL CALC-MCNC: 65 MG/DL (ref ?–100)
LYMPHOCYTES # BLD AUTO: 2.12 X10(3) UL (ref 1–4)
LYMPHOCYTES NFR BLD AUTO: 29.4 %
MCH RBC QN AUTO: 29 PG (ref 26–34)
MCHC RBC AUTO-ENTMCNC: 32.9 G/DL (ref 31–37)
MCV RBC AUTO: 88.1 FL (ref 80–100)
MONOCYTES # BLD AUTO: 0.39 X10(3) UL (ref 0.1–1)
MONOCYTES NFR BLD AUTO: 5.4 %
NEUTROPHILS # BLD AUTO: 4.58 X10 (3) UL (ref 1.5–7.7)
NEUTROPHILS # BLD AUTO: 4.58 X10(3) UL (ref 1.5–7.7)
NEUTROPHILS NFR BLD AUTO: 63.3 %
NONHDLC SERPL-MCNC: 80 MG/DL (ref ?–130)
OSMOLALITY SERPL CALC.SUM OF ELEC: 285 MOSM/KG (ref 275–295)
PLATELET # BLD AUTO: 310 10(3)UL (ref 150–450)
POTASSIUM SERPL-SCNC: 4 MMOL/L (ref 3.5–5.1)
PROT SERPL-MCNC: 6.7 G/DL (ref 5.7–8.2)
RBC # BLD AUTO: 4.38 X10(6)UL (ref 3.8–5.3)
SODIUM SERPL-SCNC: 138 MMOL/L (ref 136–145)
TOTAL IRON BINDING CAPACITY: 345 UG/DL (ref 250–425)
TRANSFERRIN SERPL-MCNC: 272 MG/DL (ref 250–380)
TRIGL SERPL-MCNC: 76 MG/DL (ref 30–149)
TSI SER-ACNC: 1.61 UIU/ML (ref 0.55–4.78)
VIT D+METAB SERPL-MCNC: 17.6 NG/ML (ref 30–100)
VLDLC SERPL CALC-MCNC: 11 MG/DL (ref 0–30)
WBC # BLD AUTO: 7.2 X10(3) UL (ref 4–11)

## 2025-04-14 PROCEDURE — 84466 ASSAY OF TRANSFERRIN: CPT

## 2025-04-14 PROCEDURE — 83540 ASSAY OF IRON: CPT

## 2025-04-14 PROCEDURE — 80061 LIPID PANEL: CPT

## 2025-04-14 PROCEDURE — 82728 ASSAY OF FERRITIN: CPT

## 2025-04-14 PROCEDURE — 80053 COMPREHEN METABOLIC PANEL: CPT

## 2025-04-14 PROCEDURE — 82306 VITAMIN D 25 HYDROXY: CPT

## 2025-04-14 PROCEDURE — 36415 COLL VENOUS BLD VENIPUNCTURE: CPT

## 2025-04-14 PROCEDURE — 85025 COMPLETE CBC W/AUTO DIFF WBC: CPT

## 2025-04-14 PROCEDURE — 84443 ASSAY THYROID STIM HORMONE: CPT

## 2025-04-14 RX ORDER — FLUOXETINE HYDROCHLORIDE 40 MG/1
CAPSULE ORAL
COMMUNITY
Start: 2025-02-03

## 2025-04-14 RX ORDER — ALBUTEROL SULFATE 90 UG/1
INHALANT RESPIRATORY (INHALATION)
Qty: 8.5 G | Refills: 1 | Status: SHIPPED | OUTPATIENT
Start: 2025-04-14

## 2025-04-14 RX ORDER — ONDANSETRON 8 MG/1
8 TABLET, FILM COATED ORAL EVERY 8 HOURS PRN
Qty: 30 TABLET | Refills: 3 | Status: SHIPPED | OUTPATIENT
Start: 2025-04-14

## 2025-04-14 NOTE — PROGRESS NOTES
HPI:    Patient ID: Xena Gallardo is a 34 year old female.    HPI  Chief Complaint   Patient presents with    Physical     Patient here in office for physical.  Last Pap completed in 2022 and was normal, HPV negative.  Reports regular menstrual cycles with heavy bleeding.      Has history of anxiety/depression.  Takes fluoxetine 60 mg daily and bupropion 300 mg daily, assisting with symptoms.     Continues to have urinary frequency.  Denies urinary urgency, incontinence, or hematuria.  Was given referral for pelvic therapy by urology, has not scheduled appointment.     Concerned about skin lesion on right anterior hand, appeared 1 week ago.  Reports history of precancerous skin lesions.      Reports intermittent diarrhea alternating with constipation.  Currently states she now has more constipation.  Unsure if she needs more fiber in diet.      Review of Systems   Constitutional: Negative.  Negative for fever.   HENT: Negative.  Negative for ear pain and sore throat.    Eyes: Negative.  Negative for visual disturbance.   Respiratory: Negative.  Negative for cough and shortness of breath.    Cardiovascular: Negative.  Negative for chest pain and palpitations.   Gastrointestinal:  Positive for constipation and diarrhea. Negative for abdominal pain and blood in stool.   Genitourinary:  Positive for frequency. Negative for dysuria, hematuria and menstrual problem.   Musculoskeletal: Negative.  Negative for arthralgias and myalgias.   Skin:  Negative for rash.        Skin lesion on the right hand   Neurological: Negative.  Negative for dizziness, facial asymmetry and headaches.   Psychiatric/Behavioral:  Positive for dysphoric mood. The patient is nervous/anxious.        /82   Pulse 84   Temp 97.9 °F (36.6 °C) (Temporal)   Ht 5' 7\" (1.702 m)   Wt (!) 346 lb 6.4 oz (157.1 kg)   LMP 03/12/2025 (Exact Date)   SpO2 96%   BMI 54.25 kg/m²     Past Medical History[1]  Past Surgical History[2]  Social History      Socioeconomic History    Marital status: Life Partner     Spouse name: Not on file    Number of children: Not on file    Years of education: Not on file    Highest education level: Not on file   Occupational History    Not on file   Tobacco Use    Smoking status: Never    Smokeless tobacco: Never   Vaping Use    Vaping status: Never Used   Substance and Sexual Activity    Alcohol use: Not Currently    Drug use: Yes     Types: Cannabis     Comment: occ    Sexual activity: Not on file   Other Topics Concern    Grew up on a farm Not Asked    History of tanning Not Asked    Outdoor occupation Not Asked    Breast feeding No    Reaction to local anesthetic No    Pt has a pacemaker No    Pt has a defibrillator No   Social History Narrative    Not on file     Social Drivers of Health     Food Insecurity: No Food Insecurity (4/14/2025)    NCSS - Food Insecurity     Worried About Running Out of Food in the Last Year: No     Ran Out of Food in the Last Year: No   Transportation Needs: No Transportation Needs (4/14/2025)    NCSS - Transportation     Lack of Transportation: No   Stress: Not on file   Housing Stability: Not At Risk (4/14/2025)    NCSS - Housing/Utilities     Has Housing: Yes     Worried About Losing Housing: No     Unable to Get Utilities: No     Family History[3]    Immunization History   Administered Date(s) Administered    Covid-19 Vaccine Pfizer 30 mcg/0.3 ml 03/10/2021, 04/07/2021    Fluvirin, 3 Years & >, Im 09/16/2013       Health Maintenance   Topic Date Due    DTaP,Tdap,and Td Vaccines (1 - Tdap) Never done    COVID-19 Vaccine (3 - 2024-25 season) 09/01/2024    Annual Depression Screening  01/01/2025    Pap Smear  01/31/2025    Annual Physical  03/27/2025    Influenza Vaccine (Season Ended) 10/01/2025    Pneumococcal Vaccine: Birth to 50yrs  Aged Out    Meningococcal B Vaccine  Aged Out        Current Medications[4]  Allergies:Allergies[5]   PHYSICAL EXAM:     Chief Complaint   Patient presents with     Physical      Physical Exam  Vitals reviewed.   Constitutional:       General: She is not in acute distress.     Appearance: Normal appearance. She is well-developed. She is not ill-appearing.   HENT:      Head: Normocephalic and atraumatic.      Right Ear: Tympanic membrane, ear canal and external ear normal. There is no impacted cerumen.      Left Ear: Tympanic membrane, ear canal and external ear normal. There is no impacted cerumen.      Nose: Nose normal. No congestion.      Mouth/Throat:      Mouth: Mucous membranes are moist.      Pharynx: Oropharynx is clear. No oropharyngeal exudate or posterior oropharyngeal erythema.   Eyes:      General:         Right eye: No discharge.         Left eye: No discharge.      Extraocular Movements: Extraocular movements intact.      Conjunctiva/sclera: Conjunctivae normal.      Pupils: Pupils are equal, round, and reactive to light.   Cardiovascular:      Rate and Rhythm: Normal rate and regular rhythm.      Heart sounds: Normal heart sounds. No murmur heard.     No friction rub. No gallop.   Pulmonary:      Effort: Pulmonary effort is normal. No respiratory distress.      Breath sounds: Normal breath sounds. No stridor. No wheezing, rhonchi or rales.   Chest:      Chest wall: No tenderness.   Abdominal:      General: Bowel sounds are normal. There is no distension.      Palpations: Abdomen is soft. There is no mass.      Tenderness: There is no abdominal tenderness. There is no right CVA tenderness, left CVA tenderness, guarding or rebound.      Hernia: No hernia is present.   Genitourinary:     General: Normal vulva.      Vagina: Normal.   Musculoskeletal:         General: No tenderness. Normal range of motion.      Cervical back: Normal range of motion and neck supple.   Lymphadenopathy:      Cervical: No cervical adenopathy.   Skin:     General: Skin is warm and dry.      Findings: Lesion present.      Comments: Small skin colored papule on right anterior hand, near  wrist   Neurological:      General: No focal deficit present.      Mental Status: She is alert and oriented to person, place, and time.      Cranial Nerves: No cranial nerve deficit.      Sensory: No sensory deficit.      Motor: No weakness.      Deep Tendon Reflexes: Reflexes are normal and symmetric.   Psychiatric:         Mood and Affect: Mood normal.         Behavior: Behavior normal.         Thought Content: Thought content normal.         Judgment: Judgment normal.                ASSESSMENT/PLAN:     Return yearly for physicals  Follow up with dentist every 6 months  Follow up with eye doctor yearly  Recommend aerobic exercise for at least 30mins 5 days a week  Yearly flu shot  Tetanus booster every 10 years (Tdap/ Td)  Labs ordered/ or reviewed if done prior to appointment     Encounter Diagnoses   Name Primary?    Routine physical examination Yes    Vitamin D deficiency     Anxiety and depression     Nausea     Shortness of breath     Constipation, unspecified constipation type     Menorrhagia with regular cycle     Skin lesion of hand        1. Routine physical examination  -Offered to complete Pap in office today, patient declined.  States she will return at a separate date to complete Pap and Tdap  - CBC With Differential With Platelet; Future  - Comp Metabolic Panel (14); Future  - Lipid Panel; Future  - TSH W Reflex To Free T4 [E]; Future    2. Vitamin D deficiency  - Vitamin D [E]; Future    3. Anxiety and depression  -Continue present management  - Follow-up with psychiatry as recommended    4. Nausea  - ondansetron (ZOFRAN) 8 MG tablet; Take 1 tablet (8 mg total) by mouth every 8 (eight) hours as needed for Nausea.  Dispense: 30 tablet; Refill: 3    5. Shortness of breath  - albuterol 108 (90 Base) MCG/ACT Inhalation Aero Soln; INHALE 2 PUFFS BY MOUTH EVERY 4 HOURS AS NEEDED FOR WHEEZING OR SHORTNESS OF BREATH( CHEST TIGHTNESS)  Dispense: 8.5 g; Refill: 1    6. Constipation, unspecified constipation  type  -Advised probiotic over-the-counter daily    7. Menorrhagia with regular cycle  - Ferritin [E]; Future  - Iron And Tibc [E]; Future    8. Skin lesion of hand  - Encouraged patient to schedule appointment with dermatology for evaluation      Orders Placed This Encounter   Procedures    CBC With Differential With Platelet    Comp Metabolic Panel (14)    Lipid Panel    TSH W Reflex To Free T4 [E]    Vitamin D [E]    Ferritin [E]    Iron And Tibc [E]       The above note was creating using Dragon speech recognition technology. Please excuse any typos    Meds This Visit:  Requested Prescriptions     Signed Prescriptions Disp Refills    ondansetron (ZOFRAN) 8 MG tablet 30 tablet 3     Sig: Take 1 tablet (8 mg total) by mouth every 8 (eight) hours as needed for Nausea.    albuterol 108 (90 Base) MCG/ACT Inhalation Aero Soln 8.5 g 1     Sig: INHALE 2 PUFFS BY MOUTH EVERY 4 HOURS AS NEEDED FOR WHEEZING OR SHORTNESS OF BREATH( CHEST TIGHTNESS)       Imaging & Referrals:  None         JARRETT Waters         [1]   Past Medical History:   Allergic rhinitis    My whole life    Anxiety    My whole life and it is now worse after getting covid    Cellulitis    Depression    Obesity   [2]   Past Surgical History:  Procedure Laterality Date    Tonsillectomy Bilateral 01/01/2013   [3]   Family History  Problem Relation Age of Onset    Breast Cancer Mother         s/p b/l mastectomy, neg BRCA    Cancer Mother         Breast Cancer    Cancer Maternal Grandmother         Lung cancer    Cancer Paternal Grandmother         Uterine Cancer    Diabetes Paternal Grandmother         Type 2   [4]   Current Outpatient Medications   Medication Sig Dispense Refill    FLUoxetine 20 MG Oral Cap TAKE ONE CAPSULE BY MOUTH DAILY FOR TOTAL OF 60 MG      FLUoxetine HCl 40 MG Oral Cap TAKE ONE CAPSULE BY MOUTH DAILY FOR A TOTAL OF 60 MG      ondansetron (ZOFRAN) 8 MG tablet Take 1 tablet (8 mg total) by mouth every 8 (eight) hours as needed for  Nausea. 30 tablet 3    albuterol 108 (90 Base) MCG/ACT Inhalation Aero Soln INHALE 2 PUFFS BY MOUTH EVERY 4 HOURS AS NEEDED FOR WHEEZING OR SHORTNESS OF BREATH( CHEST TIGHTNESS) 8.5 g 1    buPROPion  MG Oral Tablet 12 Hr Take 2 tablets (300 mg total) by mouth every morning.      Dupilumab (DUPIXENT) 300 MG/2ML Subcutaneous Solution Auto-injector Inject 300 mg into the skin every 14 (fourteen) days. 4 mL 9    pantoprazole 40 MG Oral Tab EC Take 1 tablet (40 mg total) by mouth before breakfast. 90 tablet 3    fluticasone propionate 50 MCG/ACT Nasal Suspension 1 spray by Nasal route daily. One spray per each nostril daily. 48 g 3    clonazePAM 0.5 MG Oral Tab Take 1 tablet (0.5 mg total) by mouth daily as needed for Anxiety.      ergocalciferol 1.25 MG (58499 UT) Oral Cap Take 1 capsule (50,000 Units total) by mouth once a week. (Patient not taking: Reported on 4/14/2025) 24 capsule 0   [5] No Known Allergies

## 2025-04-16 ENCOUNTER — PATIENT MESSAGE (OUTPATIENT)
Dept: FAMILY MEDICINE CLINIC | Facility: CLINIC | Age: 35
End: 2025-04-16

## 2025-04-17 NOTE — TELEPHONE ENCOUNTER
Called the patient and reviewed her results with her. She states she is nervous about taking an iron supplement because she states she has a sensitive stomach but she will start taking it and see how she tolerates it. She also stated she stopped taking her vitamin D a few months ago but she will start taking it again.

## 2025-05-05 ENCOUNTER — OFFICE VISIT (OUTPATIENT)
Dept: DERMATOLOGY CLINIC | Facility: CLINIC | Age: 35
End: 2025-05-05

## 2025-05-05 DIAGNOSIS — L20.89 OTHER ATOPIC DERMATITIS: ICD-10-CM

## 2025-05-05 DIAGNOSIS — D23.9 BENIGN NEOPLASM OF SKIN, UNSPECIFIED LOCATION: ICD-10-CM

## 2025-05-05 DIAGNOSIS — M67.40 GANGLION: Primary | ICD-10-CM

## 2025-05-05 DIAGNOSIS — Z79.620 LONG TERM (CURRENT) USE OF IMMUNOSUPPRESSIVE BIOLOGIC: ICD-10-CM

## 2025-05-05 PROCEDURE — 99213 OFFICE O/P EST LOW 20 MIN: CPT | Performed by: DERMATOLOGY

## 2025-05-05 RX ORDER — CLOBETASOL PROPIONATE 0.5 MG/G
1 CREAM TOPICAL 2 TIMES DAILY
Qty: 60 G | Refills: 0 | Status: SHIPPED | OUTPATIENT
Start: 2025-05-05 | End: 2026-05-05

## 2025-05-05 NOTE — PROGRESS NOTES
The following individual(s) verbally consented to be recorded using ambient AI listening technology and understand that they can each withdraw their consent to this listening technology at any point by asking the clinician to turn off or pause the recording:    Patient name: Xena Gallardo  Additional names:

## 2025-05-05 NOTE — PROGRESS NOTES
Xena Gallardo is a 34 year old female.    Chief Complaint   Patient presents with    Lesion     LOV 1/20/25  Pt presents with c/o spot under the skin on R hand, for 1 month. The skin texture is rough and peeling.              Patient has no known allergies.  Current Outpatient Medications   Medication Sig Dispense Refill    clobetasol 0.05 % External Cream Apply 1 Application topically 2 (two) times daily. 60 g 0    ergocalciferol 1.25 MG (20213 UT) Oral Cap Take 1 capsule (50,000 Units total) by mouth once a week. 24 capsule 0    Ferrous Sulfate 325 (65 Fe) MG Oral Tab Take 1 tablet (325 mg total) by mouth daily with breakfast. 30 tablet 0    FLUoxetine 20 MG Oral Cap TAKE ONE CAPSULE BY MOUTH DAILY FOR TOTAL OF 60 MG      FLUoxetine HCl 40 MG Oral Cap TAKE ONE CAPSULE BY MOUTH DAILY FOR A TOTAL OF 60 MG      ondansetron (ZOFRAN) 8 MG tablet Take 1 tablet (8 mg total) by mouth every 8 (eight) hours as needed for Nausea. 30 tablet 3    albuterol 108 (90 Base) MCG/ACT Inhalation Aero Soln INHALE 2 PUFFS BY MOUTH EVERY 4 HOURS AS NEEDED FOR WHEEZING OR SHORTNESS OF BREATH( CHEST TIGHTNESS) 8.5 g 1    buPROPion  MG Oral Tablet 12 Hr Take 2 tablets (300 mg total) by mouth every morning.      Dupilumab (DUPIXENT) 300 MG/2ML Subcutaneous Solution Auto-injector Inject 300 mg into the skin every 14 (fourteen) days. 4 mL 9    pantoprazole 40 MG Oral Tab EC Take 1 tablet (40 mg total) by mouth before breakfast. 90 tablet 3    fluticasone propionate 50 MCG/ACT Nasal Suspension 1 spray by Nasal route daily. One spray per each nostril daily. 48 g 3    clonazePAM 0.5 MG Oral Tab Take 1 tablet (0.5 mg total) by mouth daily as needed for Anxiety.        Past Medical History:    Allergic rhinitis    My whole life    Anxiety    My whole life and it is now worse after getting covid    Cellulitis    Depression    Obesity      Social History:  Social History     Socioeconomic History    Marital status: Life Partner   Tobacco  Use    Smoking status: Never    Smokeless tobacco: Never   Vaping Use    Vaping status: Never Used   Substance and Sexual Activity    Alcohol use: Not Currently    Drug use: Yes     Types: Cannabis     Comment: occ   Other Topics Concern    Breast feeding No    Reaction to local anesthetic No    Pt has a pacemaker No    Pt has a defibrillator No     Social Drivers of Health     Food Insecurity: No Food Insecurity (4/14/2025)    NCSS - Food Insecurity     Worried About Running Out of Food in the Last Year: No     Ran Out of Food in the Last Year: No   Transportation Needs: No Transportation Needs (4/14/2025)    NCSS - Transportation     Lack of Transportation: No   Housing Stability: Not At Risk (4/14/2025)    NCSS - Housing/Utilities     Has Housing: Yes     Worried About Losing Housing: No     Unable to Get Utilities: No                 Current Outpatient Medications   Medication Sig Dispense Refill    clobetasol 0.05 % External Cream Apply 1 Application topically 2 (two) times daily. 60 g 0    ergocalciferol 1.25 MG (30378 UT) Oral Cap Take 1 capsule (50,000 Units total) by mouth once a week. 24 capsule 0    Ferrous Sulfate 325 (65 Fe) MG Oral Tab Take 1 tablet (325 mg total) by mouth daily with breakfast. 30 tablet 0    FLUoxetine 20 MG Oral Cap TAKE ONE CAPSULE BY MOUTH DAILY FOR TOTAL OF 60 MG      FLUoxetine HCl 40 MG Oral Cap TAKE ONE CAPSULE BY MOUTH DAILY FOR A TOTAL OF 60 MG      ondansetron (ZOFRAN) 8 MG tablet Take 1 tablet (8 mg total) by mouth every 8 (eight) hours as needed for Nausea. 30 tablet 3    albuterol 108 (90 Base) MCG/ACT Inhalation Aero Soln INHALE 2 PUFFS BY MOUTH EVERY 4 HOURS AS NEEDED FOR WHEEZING OR SHORTNESS OF BREATH( CHEST TIGHTNESS) 8.5 g 1    buPROPion  MG Oral Tablet 12 Hr Take 2 tablets (300 mg total) by mouth every morning.      Dupilumab (DUPIXENT) 300 MG/2ML Subcutaneous Solution Auto-injector Inject 300 mg into the skin every 14 (fourteen) days. 4 mL 9    pantoprazole  40 MG Oral Tab EC Take 1 tablet (40 mg total) by mouth before breakfast. 90 tablet 3    fluticasone propionate 50 MCG/ACT Nasal Suspension 1 spray by Nasal route daily. One spray per each nostril daily. 48 g 3    clonazePAM 0.5 MG Oral Tab Take 1 tablet (0.5 mg total) by mouth daily as needed for Anxiety.       Allergies:   Allergies[1]    Past Medical History:    Allergic rhinitis    My whole life    Anxiety    My whole life and it is now worse after getting covid    Cellulitis    Depression    Obesity     Past Surgical History:   Procedure Laterality Date    Tonsillectomy Bilateral 01/01/2013     Social History     Socioeconomic History    Marital status: Life Partner     Spouse name: Not on file    Number of children: Not on file    Years of education: Not on file    Highest education level: Not on file   Occupational History    Not on file   Tobacco Use    Smoking status: Never    Smokeless tobacco: Never   Vaping Use    Vaping status: Never Used   Substance and Sexual Activity    Alcohol use: Not Currently    Drug use: Yes     Types: Cannabis     Comment: occ    Sexual activity: Not on file   Other Topics Concern    Grew up on a farm Not Asked    History of tanning Not Asked    Outdoor occupation Not Asked    Breast feeding No    Reaction to local anesthetic No    Pt has a pacemaker No    Pt has a defibrillator No   Social History Narrative    Not on file     Social Drivers of Health     Food Insecurity: No Food Insecurity (4/14/2025)    NCSS - Food Insecurity     Worried About Running Out of Food in the Last Year: No     Ran Out of Food in the Last Year: No   Transportation Needs: No Transportation Needs (4/14/2025)    NCSS - Transportation     Lack of Transportation: No   Stress: Not on file   Housing Stability: Not At Risk (4/14/2025)    NCSS - Housing/Utilities     Has Housing: Yes     Worried About Losing Housing: No     Unable to Get Utilities: No     Family History   Problem Relation Age of Onset     Breast Cancer Mother         s/p b/l mastectomy, neg BRCA    Cancer Mother         Breast Cancer    Cancer Maternal Grandmother         Lung cancer    Cancer Paternal Grandmother         Uterine Cancer    Diabetes Paternal Grandmother         Type 2                      HPI :      Chief Complaint   Patient presents with    Lesion     LOV 1/20/25  Pt presents with c/o spot under the skin on R hand, for 1 month. The skin texture is rough and peeling.        History of Present Illness  1/25    The patient, on Degrexant for severe esophageal dermatitis, reports a significant improvement in her condition. She notes that her hands are slightly irritated, which she attributes to the weather, but she has not experienced her typical blistering. Occasional blisters do appear, but they are not as frequent or severe as before. The patient has been using Eucerin for topical relief, but reports that her fingers have been extra dry in the past few weeks.    The patient also mentions that she has a history of skin infections, including a staph infection on her hip and an infection in her hands. She expresses interest in getting a tattoo, but is concerned about potential complications due to her skin condition and history of infections.    The patient is satisfied with the effects of Dupixent, describing it as \"life changing\" and \"life saving.\" She has not tried any new medications and has no plans to switch from Dupixent, given its effectiveness and safety profile. She is aware of other treatment options, such as RAVI inhibitors, but expresses no interest in them due to the potential side effects and the need for regular lab tests.    05/05/25    Xena Gallardo is a 34 year old female who presents with a new round-shaped lesion on her scalp. She was referred by her primary care physician for further evaluation of the lesion.    Approximately one and a half months ago, she noticed the development of a new round-shaped lesion on  her scalp. The area is typically drier and hairier, but the new shape was a recent change. The lesion has a scaly surface with peeling, which is atypical for her usual skin issues. It is not painful but occasionally itches, possibly due to dryness.    She has been applying moisturizers like Eucerin but has not used any steroid creams. She has a history of eczema, primarily affecting her hands, but states that this new lesion is unlike her previous skin conditions. Her eczema is currently well-managed.    No pain associated with the scalp lesion. Reports occasional itching and dryness at the site of the lesion.    Patient presents with concerns above.    No personal   history of skin cancer    Family history of melanoma in maternal grandmother    Past notes/ records and appropriate/relevant lab results including pathology and past body maps reviewed. Updated and new information noted in current visit.       ROS:    Denies any other systemic complaints.  No fevers, chills, night sweats, sensitivity to the sun, deeper lumps or bumps.  No other skin complaints.  History, medications, allergies as noted.    Physical examination: Patient  well-developed well-nourished, alert oriented in no acute distress.  Exam of involved, appropriate areas of skin performed,  Remarkable for lesions as noted   Physical Exam  SKIN: Numerous freckles present.  Extensive nevi  No clinically atypical lesions at this time    05/05/25    SKIN: Scaly skin on the surface of the dorsal hand wrist with no visible pore at the surface of the scalp lesion.    See map for details     ASSESSMENT AND PLAN:     Encounter Diagnoses   Name Primary?    Ganglion Yes    Other atopic dermatitis     Benign neoplasm of skin, unspecified location     Long term (current) use of immunosuppressive biologic      Meds & Refills for this Visit:   Requested Prescriptions     Signed Prescriptions Disp Refills    clobetasol 0.05 % External Cream 60 g 0     Sig: Apply 1  Application topically 2 (two) times daily.       No orders of the defined types were placed in this encounter.    Assessment / plan:    Assessment & Plan  Atopic dermatitis longstanding    Dupixent is effective with significant improvement. Occasional dryness and irritation around fingers, likely due to cold weather. No blistering or severe symptoms. Uses Eucerin for management. Discussed triamcinolone ointment for nighttime use, noting its stronger and greasier formulation may be beneficial.  - Prescribe triamcinolone ointment for nighttime use.    Dupixent Therapy for Severe Eczema  Significant improvement with Dupixent. No indication to switch to RAVI inhibitors due to higher side effect profile and need for frequent lab monitoring. Dupixent has a robust safety profile, including in older adults.  - Continue Dupixent therapy.    Patient with more xerotic changes on the hands with winter weather, work triamcinolone ointment as needed overall has had minimal flareups on Dupixent has not needed to use topicals      Tattoo   Has not had any problems with new tattoos with her eczema  05/05/25    Other atopic dermatitis  Eczema is well-controlled. She reports a new flaky, scaly area on the scalp, which is atypical for her usual skin issues. The area is dry and slightly pruritic, possibly due to friction or dryness. Clobetasol topical may help manage the symptoms.  - Prescribe clobetasol topical for the flaky, scaly area on the right dorsal hand    Ganglion cyst  A small ganglion cyst is present, likely originating from the joint. It is non-painful but slightly irritating due to its location. The cyst is benign but can be bothersome. It may fluctuate in size and could potentially resolve spontaneously. Surgical removal or injection could be considered, but these options carry risks, such as joint space complications. Injection under fluoroscopy is a potential approach to ensure accuracy and minimize risks.  - Consider  referral to orthopedic hand specialist for further evaluation and management of the ganglion cyst.    Continue careful monitoring of pigmented lesions with family history of melanoma    Benign-appearing nevi, no atypical features on dermoscopy reassurance given monitor.  Extensive nevi    Few waxy tan keratotic papules lesions in areas of concern as noted reassurance given.  Benign nature discussed.  Possibility of cryo, alphahydroxy acids over-the-counter retinol's discussed.    No other susupicious lesions on todays  exam.    Monitor for new or changing lesions. Signs and symptoms of skin cancer, ABCDE's of melanoma ( additional information available at AAD.org, skincancer.org) Encourage Sunscreen (broad-spectrum, ideally mineral-based-UVA/UVB -SPF 30 or higher) use encouraged, sun protection/sun protective clothing, self exams reviewed Followup as noted RTC ---routine checkup 6 mos -one year or p.r.n.    Encounter Times   Including precharting, reviewing chart, prior notes obtaining history: 10 minutes, medical exam :10 minutes, notes on body map, plan, counseling 10minutes My total time spent caring for the patient on the day of the encounter: 30 minutes     The patient indicates understanding of these issues and agrees to the plan.  The patient is asked to return as noted in follow-up/ above.    This note was generated using Dragon voice recognition software.  Please contact me regarding any confusion resulting from errors in recognition..  Note to patient and family: The 21st Century Cures Act makes medical notes like these available to patients. However, be advised this is a medical document. It is intended as [id number] communication and monitoring of a patient's care needs. It is written in medical language and may contain abbreviations or verbiage that are unfamiliar. It may appear blunt or direct. Medical documents are intended to carry relevant information, facts as evident and the clinical opinion of  the practitioner.      No orders of the defined types were placed in this encounter.          No diagnosis found.    No orders of the defined types were placed in this encounter.      Results From Past 48 Hours:  No results found for this or any previous visit (from the past 48 hours).    Meds This Visit:      Imaging Orders:  None     Referral Orders:  No orders of the defined types were placed in this encounter.                 [1] No Known Allergies

## 2025-05-12 ENCOUNTER — OFFICE VISIT (OUTPATIENT)
Dept: FAMILY MEDICINE CLINIC | Facility: CLINIC | Age: 35
End: 2025-05-12
Payer: COMMERCIAL

## 2025-05-12 ENCOUNTER — TELEPHONE (OUTPATIENT)
Dept: FAMILY MEDICINE CLINIC | Facility: CLINIC | Age: 35
End: 2025-05-12

## 2025-05-12 VITALS
DIASTOLIC BLOOD PRESSURE: 75 MMHG | HEART RATE: 80 BPM | TEMPERATURE: 98 F | SYSTOLIC BLOOD PRESSURE: 120 MMHG | OXYGEN SATURATION: 95 % | WEIGHT: 293 LBS | HEIGHT: 67 IN | BODY MASS INDEX: 45.99 KG/M2

## 2025-05-12 DIAGNOSIS — R79.0 LOW FERRITIN: Primary | ICD-10-CM

## 2025-05-12 PROCEDURE — G2211 COMPLEX E/M VISIT ADD ON: HCPCS | Performed by: STUDENT IN AN ORGANIZED HEALTH CARE EDUCATION/TRAINING PROGRAM

## 2025-05-12 PROCEDURE — 99213 OFFICE O/P EST LOW 20 MIN: CPT | Performed by: STUDENT IN AN ORGANIZED HEALTH CARE EDUCATION/TRAINING PROGRAM

## 2025-05-12 PROCEDURE — 3008F BODY MASS INDEX DOCD: CPT | Performed by: STUDENT IN AN ORGANIZED HEALTH CARE EDUCATION/TRAINING PROGRAM

## 2025-05-12 PROCEDURE — 3074F SYST BP LT 130 MM HG: CPT | Performed by: STUDENT IN AN ORGANIZED HEALTH CARE EDUCATION/TRAINING PROGRAM

## 2025-05-12 PROCEDURE — 3078F DIAST BP <80 MM HG: CPT | Performed by: STUDENT IN AN ORGANIZED HEALTH CARE EDUCATION/TRAINING PROGRAM

## 2025-05-12 NOTE — PROGRESS NOTES
HPI:    Patient ID: Xena Gallardo is a 34 year old female.    HPI  Pt presenting for follow-up.    H/o dysmenorrhea  H/o fatigue  Recent labs notable for low ferritin  Had trialed oral iron but notes some GI side effects  Interested in treatment options      Review of Systems   A comprehensive 10 point review of systems was completed.  Pertinent positives and negatives noted in the the HPI.     Current Medications[1]  Allergies:Allergies[2]   Vitals:    05/12/25 0811   BP: 120/75   Pulse: 80   Temp: 98.1 °F (36.7 °C)   TempSrc: Temporal   SpO2: 95%   Weight: (!) 350 lb (158.8 kg)   Height: 5' 7\" (1.702 m)       Body mass index is 54.82 kg/m².   PHYSICAL EXAM:   Physical Exam  Vitals reviewed.   Constitutional:       General: She is not in acute distress.  Eyes:      Conjunctiva/sclera: Conjunctivae normal.   Cardiovascular:      Rate and Rhythm: Normal rate.      Pulses: Normal pulses.   Pulmonary:      Effort: Pulmonary effort is normal.   Neurological:      Mental Status: She is alert and oriented to person, place, and time. Mental status is at baseline.   Psychiatric:         Mood and Affect: Mood normal.         Behavior: Behavior normal.              ASSESSMENT/PLAN:   1. Low ferritin  Recent labs reviewed  Discussed treatment options  Encouraged dietary iron supplementation  Will trial iron infusion due to poor PO tolerance  Anticipate follow-up labs 4 weeks after dosing  - to call with any questions/concerns    Pt verbalized understanding and agrees with plan.      No orders of the defined types were placed in this encounter.      Meds This Visit:  Requested Prescriptions      No prescriptions requested or ordered in this encounter       Imaging & Referrals:  None         ID#[id number]         [1]   Current Outpatient Medications   Medication Sig Dispense Refill    ergocalciferol 1.25 MG (62543 UT) Oral Cap Take 1 capsule (50,000 Units total) by mouth once a week. 24 capsule 0    FLUoxetine 20 MG Oral Cap TAKE  ONE CAPSULE BY MOUTH DAILY FOR TOTAL OF 60 MG      FLUoxetine HCl 40 MG Oral Cap TAKE ONE CAPSULE BY MOUTH DAILY FOR A TOTAL OF 60 MG      ondansetron (ZOFRAN) 8 MG tablet Take 1 tablet (8 mg total) by mouth every 8 (eight) hours as needed for Nausea. 30 tablet 3    albuterol 108 (90 Base) MCG/ACT Inhalation Aero Soln INHALE 2 PUFFS BY MOUTH EVERY 4 HOURS AS NEEDED FOR WHEEZING OR SHORTNESS OF BREATH( CHEST TIGHTNESS) 8.5 g 1    buPROPion  MG Oral Tablet 12 Hr Take 2 tablets (300 mg total) by mouth every morning.      Dupilumab (DUPIXENT) 300 MG/2ML Subcutaneous Solution Auto-injector Inject 300 mg into the skin every 14 (fourteen) days. 4 mL 9    pantoprazole 40 MG Oral Tab EC Take 1 tablet (40 mg total) by mouth before breakfast. 90 tablet 3    fluticasone propionate 50 MCG/ACT Nasal Suspension 1 spray by Nasal route daily. One spray per each nostril daily. 48 g 3    clonazePAM 0.5 MG Oral Tab Take 1 tablet (0.5 mg total) by mouth daily as needed for Anxiety.      clobetasol 0.05 % External Cream Apply 1 Application topically 2 (two) times daily. (Patient not taking: Reported on 5/12/2025) 60 g 0    Ferrous Sulfate 325 (65 Fe) MG Oral Tab Take 1 tablet (325 mg total) by mouth daily with breakfast. (Patient not taking: Reported on 5/12/2025) 30 tablet 0   [2] No Known Allergies

## 2025-05-14 PROBLEM — R79.0 LOW FERRITIN: Status: ACTIVE | Noted: 2025-05-14

## 2025-06-02 ENCOUNTER — OFFICE VISIT (OUTPATIENT)
Age: 35
End: 2025-06-02
Attending: STUDENT IN AN ORGANIZED HEALTH CARE EDUCATION/TRAINING PROGRAM
Payer: COMMERCIAL

## 2025-06-02 VITALS
DIASTOLIC BLOOD PRESSURE: 81 MMHG | TEMPERATURE: 99 F | OXYGEN SATURATION: 98 % | RESPIRATION RATE: 18 BRPM | SYSTOLIC BLOOD PRESSURE: 139 MMHG | HEART RATE: 92 BPM

## 2025-06-02 DIAGNOSIS — R79.0 LOW FERRITIN: Primary | ICD-10-CM

## 2025-06-02 NOTE — PROGRESS NOTES
Pt here for Venofer 200mg IVP 1 of 2 . Pt denies any issues or concerns. Procedure explained to pt.  Pt verbalized understanding.  PIV placed, Venofer 200mg slow IVP via side port of a free flowing bag of 0.9NS.      Pt tolerated infusion without difficulty or complaint. Reviewed next apt date/time: [facility]      Education Record  Learner:  Patient  Disease / Diagnosis: MARIO  Barriers / Limitations:  None  Method:  Discussion  General Topics:  Plan of care reviewed  Outcome:  Shows understanding

## 2025-06-16 ENCOUNTER — OFFICE VISIT (OUTPATIENT)
Dept: FAMILY MEDICINE CLINIC | Facility: CLINIC | Age: 35
End: 2025-06-16

## 2025-06-16 ENCOUNTER — OFFICE VISIT (OUTPATIENT)
Age: 35
End: 2025-06-16
Attending: STUDENT IN AN ORGANIZED HEALTH CARE EDUCATION/TRAINING PROGRAM
Payer: COMMERCIAL

## 2025-06-16 VITALS
OXYGEN SATURATION: 97 % | WEIGHT: 293 LBS | HEART RATE: 86 BPM | HEIGHT: 67 IN | DIASTOLIC BLOOD PRESSURE: 82 MMHG | SYSTOLIC BLOOD PRESSURE: 132 MMHG | BODY MASS INDEX: 45.99 KG/M2

## 2025-06-16 VITALS
TEMPERATURE: 99 F | DIASTOLIC BLOOD PRESSURE: 48 MMHG | HEART RATE: 96 BPM | RESPIRATION RATE: 18 BRPM | SYSTOLIC BLOOD PRESSURE: 124 MMHG | OXYGEN SATURATION: 98 %

## 2025-06-16 DIAGNOSIS — R79.0 LOW FERRITIN: ICD-10-CM

## 2025-06-16 DIAGNOSIS — Z12.4 SCREENING FOR CERVICAL CANCER: Primary | ICD-10-CM

## 2025-06-16 DIAGNOSIS — R79.0 LOW FERRITIN: Primary | ICD-10-CM

## 2025-06-16 NOTE — PROGRESS NOTES
Patient arrives to infusion for Venofer. Patient denies any issues or concerns.      Ordering Provider: Dinah Bui MD  Order Exp: after this dose     Patient appeared to tolerate infusion without difficulty or complaint. No s/s of adverse reaction noted.     Reviewed next apt date/time: pt to follow up with ordering provider, pt verbalized understanding.     Education Record  Learner:  Patient  Disease / Diagnosis: low ferritin  Barriers / Limitations:  None  Method:  Discussion and Reinforcement  General Topics:  Medication, Side effects and symptom management, and Plan of care reviewed  Outcome:  Shows understanding

## 2025-06-16 NOTE — PROGRESS NOTES
HPI:    Patient ID: Xena Gallardo is a 34 year old female.    HPI  Pt presenting for follow-up and Pap.    H/o low ferritin  Completed 1 infusion  Denies any side effects or noticeable benefit at this point  LMP last week -- reports increased cramping/flow  Due for 2nd infusion later today      Review of Systems   A comprehensive 10 point review of systems was completed.  Pertinent positives and negatives noted in the the HPI.     Current Medications[1]  Allergies:Allergies[2]   Vitals:    06/16/25 0925   BP: 132/82   Pulse: 86   SpO2: 97%   Weight: (!) 354 lb 9.6 oz (160.8 kg)   Height: 5' 7\" (1.702 m)       Body mass index is 55.54 kg/m².   PHYSICAL EXAM:   Physical Exam  Vitals reviewed.   Constitutional:       General: She is not in acute distress.  Eyes:      Conjunctiva/sclera: Conjunctivae normal.   Cardiovascular:      Rate and Rhythm: Normal rate.      Pulses: Normal pulses.   Pulmonary:      Effort: Pulmonary effort is normal.   Genitourinary:     General: Normal vulva.      Exam position: Lithotomy position.      Vagina: Normal.      Cervix: Normal.      Uterus: Not tender.       Adnexa:         Right: No tenderness.          Left: No tenderness.     Neurological:      General: No focal deficit present.      Mental Status: She is alert and oriented to person, place, and time. Mental status is at baseline.   Psychiatric:         Mood and Affect: Mood normal.         Behavior: Behavior normal.             ASSESSMENT/PLAN:   1. Screening for cervical cancer  - ThinPrep PAP Smear; Future  - Hpv Dna  High Risk , Thin Prep Collect; Future  - Hpv Dna  High Risk , Thin Prep Collect  - ThinPrep PAP Smear  - THINPREP PAP SMEAR ONLY    2. Low ferritin  Will plan to recheck labs 3-4 weeks after infusion  Continue to monitor  - to call with any questions/concerns    Pt verbalized understanding and agrees with plan.      Orders Placed This Encounter   Procedures    Hpv Dna  High Risk , Thin Prep Collect    ThinPrep  PAP Smear    THINPREP PAP SMEAR ONLY       Meds This Visit:  Requested Prescriptions      No prescriptions requested or ordered in this encounter       Imaging & Referrals:  None         ID#[id number]         [1]   Current Outpatient Medications   Medication Sig Dispense Refill    ergocalciferol 1.25 MG (53596 UT) Oral Cap Take 1 capsule (50,000 Units total) by mouth once a week. 24 capsule 0    FLUoxetine 20 MG Oral Cap TAKE ONE CAPSULE BY MOUTH DAILY FOR TOTAL OF 60 MG      FLUoxetine HCl 40 MG Oral Cap TAKE ONE CAPSULE BY MOUTH DAILY FOR A TOTAL OF 60 MG      ondansetron (ZOFRAN) 8 MG tablet Take 1 tablet (8 mg total) by mouth every 8 (eight) hours as needed for Nausea. 30 tablet 3    albuterol 108 (90 Base) MCG/ACT Inhalation Aero Soln INHALE 2 PUFFS BY MOUTH EVERY 4 HOURS AS NEEDED FOR WHEEZING OR SHORTNESS OF BREATH( CHEST TIGHTNESS) 8.5 g 1    buPROPion  MG Oral Tablet 12 Hr Take 2 tablets (300 mg total) by mouth every morning.      Dupilumab (DUPIXENT) 300 MG/2ML Subcutaneous Solution Auto-injector Inject 300 mg into the skin every 14 (fourteen) days. 4 mL 9    pantoprazole 40 MG Oral Tab EC Take 1 tablet (40 mg total) by mouth before breakfast. 90 tablet 3    fluticasone propionate 50 MCG/ACT Nasal Suspension 1 spray by Nasal route daily. One spray per each nostril daily. 48 g 3    clonazePAM 0.5 MG Oral Tab Take 1 tablet (0.5 mg total) by mouth daily as needed for Anxiety.     [2] No Known Allergies

## 2025-06-17 LAB — HPV E6+E7 MRNA CVX QL NAA+PROBE: NEGATIVE

## 2025-07-07 ENCOUNTER — LAB ENCOUNTER (OUTPATIENT)
Dept: LAB | Age: 35
End: 2025-07-07
Payer: COMMERCIAL

## 2025-07-07 DIAGNOSIS — E61.1 IRON DEFICIENCY: ICD-10-CM

## 2025-07-07 LAB — DEPRECATED HBV CORE AB SER IA-ACNC: 48 NG/ML (ref 50–306)

## 2025-07-07 PROCEDURE — 82728 ASSAY OF FERRITIN: CPT

## 2025-07-07 PROCEDURE — 36415 COLL VENOUS BLD VENIPUNCTURE: CPT

## (undated) DEVICE — CONMED SCOPE SAVER BITE BLOCK, 20X27 MM: Brand: SCOPE SAVER

## (undated) DEVICE — YANKAUER,BULB TIP,W/O VENT,RIGID,STERILE: Brand: MEDLINE

## (undated) DEVICE — 60 ML SYRINGE REGULAR TIP: Brand: MONOJECT

## (undated) DEVICE — MEDI-VAC NON-CONDUCTIVE SUCTION TUBING 6MM X 1.8M (6FT.) L: Brand: CARDINAL HEALTH

## (undated) DEVICE — KIT CLEAN ENDOKIT 1.1OZ GOWNX2

## (undated) DEVICE — Device: Brand: DUAL NARE NASAL CANNULAE FEMALE LUER CON 7FT O2 TUBE

## (undated) DEVICE — GIJAW SINGLE-USE BIOPSY FORCEPS WITH NEEDLE: Brand: GIJAW

## (undated) DEVICE — MEDI-VAC NON-CONDUCTIVE SUCTION TUBING: Brand: CARDINAL HEALTH

## (undated) DEVICE — KIT ENDO ORCAPOD 160/180/190

## (undated) NOTE — LETTER
AUTHORIZATION FOR SURGICAL OPERATION OR OTHER PROCEDURE    1.  [facility] to my [facility] to perform the following operation and/or procedure at the [facility]:      Skin tag removal  ______ ________ A. M.  P.M.        Patient Name:  ______________________________________________________  (please print)      Patient signature:  ___________________________________________________             Relationship to Patient:           []  Parent    Respon

## (undated) NOTE — LETTER
[facility]  [address], [place], [place]  Authorization for Surgical Operation and Procedure                                                                                           I hereby authorize Rhiannon Veras MD, my physician and his/her assistants (if applicable), which may include medical students, residents, and/or fellows, to perform the following surgical operation/ procedure and administer such anesthesia as may be determined necessary by my physician: Operation/Procedure name (s) ESOPHAGOGASTRODUODENOSCOPY on Xena Gallardo   2.   I recognize that during the surgical operation/procedure, unforeseen conditions may necessitate additional or different procedures than those listed above.  I, therefore, further authorize and request that the above-named surgeon, assistants, or designees perform such procedures as are, in their judgment, necessary and desirable.    3.   My surgeon/physician has discussed prior to my surgery the potential benefits, risks and side effects of this procedure; the likelihood of achieving goals; and potential problems that might occur during recuperation.  They also discussed reasonable alternatives to the procedure, including risks, benefits, and side effects related to the alternatives and risks related to not receiving this procedure.  I have had all my questions answered and I acknowledge that no guarantee has been made as to the result that may be obtained.    4.   Should the need arise during my operation/procedure, which includes change of level of care prior to discharge, I also consent to the administration of blood and/or blood products.  Further, I understand that despite careful testing and screening of blood or blood products by collecting agencies, I may still be subject to ill effects as a result of receiving a blood transfusion and/or blood products.  The following are some, but not all, of the potential risks that can  occur: fever and allergic reactions, hemolytic reactions, transmission of diseases such as Hepatitis, AIDS and Cytomegalovirus (CMV) and fluid overload.  In the event that I wish to have an autologous transfusion of my own blood, or a directed donor transfusion, I will discuss this with my physician.  Check only if Refusing Blood or Blood Products  I understand refusal of blood or blood products as deemed necessary by my physician may have serious consequences to my condition to include possible death. I hereby assume responsibility for my refusal and release the hospital, its personnel, and my physicians from any responsibility for the consequences of my refusal.    o  Refuse   5.   I authorize the use of any specimen, organs, tissues, body parts or foreign objects that may be removed from my body during the operation/procedure for diagnosis, research or teaching purposes and their subsequent disposal by hospital authorities.  I also authorize the release of specimen test results and/or written reports to my treating physician on the hospital medical staff or other referring or consulting physicians involved in my care, at the discretion of the Pathologist or my treating physician.    6.   I consent to the photographing or videotaping of the operations or procedures to be performed, including appropriate portions of my body for medical, scientific, or educational purposes, provided my identity is not revealed by the pictures or by descriptive texts accompanying them.  If the procedure has been photographed/videotaped, the surgeon will obtain the original picture, image, videotape or CD.  The hospital will not be responsible for storage, release or maintenance of the picture, image, tape or CD.    7.   I consent to the presence of a  or observers in the operating room as deemed necessary by my physician or their designees.    8.   I recognize that in the event my procedure results in extended  X-Ray/fluoroscopy time, I may develop a skin reaction.    9. If I have a Do Not Attempt Resuscitation (DNAR) order in place, that status will be suspended while in the operating room, procedural suite, and during the recovery period unless otherwise explicitly stated by me (or a person authorized to consent on my behalf). The surgeon or my attending physician will determine when the applicable recovery period ends for purposes of reinstating the DNAR order.  10. Patients having a sterilization procedure: I understand that if the procedure is successful the results will be permanent and it will therefore be impossible for me to inseminate, conceive, or bear children.  I also understand that the procedure is intended to result in sterility, although the result has not been guaranteed.   11. I acknowledge that my physician has explained sedation/analgesia administration to me including the risk and benefits I consent to the administration of sedation/analgesia as may be necessary or desirable in the judgment of my physician.    I CERTIFY THAT I HAVE READ AND FULLY UNDERSTAND THE ABOVE CONSENT TO OPERATION and/or OTHER PROCEDURE.     _________________________________________ _________________________________     ___________________________________  Signature of Patient     Signature of Responsible Person                   Printed Name of Responsible Person                              _________________________________________ ______________________________        ___________________________________  Signature of Witness         Date  Time         Relationship to Patient    STATEMENT OF PHYSICIAN My signature below affirms that prior to the time of the procedure; I have explained to the patient and/or his/her legal representative, the risks and benefits involved in the proposed treatment and any reasonable alternative to the proposed treatment. I have also explained the risks and benefits involved in refusal of the  proposed treatment and alternatives to the proposed treatment and have answered the patient's questions. If I have a significant financial interest in a co-management agreement or a significant financial interest in any product or implant, or other significant relationship used in this procedure/surgery, I have disclosed this and had a discussion with my patient.     _______________________________________________________________ _____________________________  (Signature of Physician)                                                                                         (Date)                                   (Time)  Patient Name: Xena Gallardo    : 1990   Printed: 3/15/2024      Medical Record #: [id number]                                              Page 1 of 1

## (undated) NOTE — LETTER
[facility]  Administration of Anesthesia  IXena agree to be cared for by a physician anesthesiologist alone and/or with a nurse anesthetist, who is specially trained to monitor me and give me medicine to put me to sleep or keep me comfortable during my procedure    I understand that my anesthesiologist and/or anesthetist is not an employee or agent of [facility] or [employer]. He or she works for [facility]    As the patient asking for anesthesia services, I agree to:  Allow the anesthesiologist (anesthesia doctor) to give me medicine and do additional procedures as necessary. Some examples are: Starting or using an “IV” to give me medicine, fluids or blood during my procedure, and having a breathing tube placed to help me breathe when I’m asleep (intubation). In the event that my heart stops working properly, I understand that my anesthesiologist will make every effort to sustain my life, unless otherwise directed by [facility] Do Not Resuscitate documents.  Tell my anesthesia doctor before my procedure:  If I am pregnant.  The last time that I ate or drank.  iii. All of the medicines I take (including prescriptions, herbal supplements, and pills I can buy without a prescription (including street drugs/illegal medications). Failure to inform my anesthesiologist about these medicines may increase my risk of anesthetic complications.  iv.If I am allergic to anything or have had a reaction to anesthesia before.  I understand how the anesthesia medicine will help me (benefits).  I understand that with any type of anesthesia medicine there are risks:  The most common risks are: nausea, vomiting, sore throat, muscle soreness, damage to my eyes, mouth, or teeth (from breathing tube placement).  Rare risks include: remembering what happened during my procedure, allergic reactions to medications, injury to my airway, heart, lungs, vision, nerves, or  muscles and in extremely rare instances death.  My doctor has explained to me other choices available to me for my care (alternatives).  Pregnant Patients (“epidural”):  I understand that the risks of having an epidural (medicine given into my back to help control pain during labor), include itching, low blood pressure, difficulty urinating, headache or slowing of the baby’s heart. Very rare risks include infection, bleeding, seizure, irregular heart rhythms and nerve injury.  Regional Anesthesia (“spinal”, “epidural”, & “nerve blocks”):  I understand that rare but potential complications include headache, bleeding, infection, seizure, irregular heart rhythms, and nerve injury.    _____________________________________________________________________________  Patient (or Representative) Signature/Relationship to Patient  Date   Time    _____________________________________________________________________________   Name (if used)    Language/Organization   Time    _____________________________________________________________________________  Nurse Anesthetist Signature     Date   Time  _____________________________________________________________________________  Anesthesiologist Signature     Date   Time  I have discussed the procedure and information above with the patient (or patient’s representative) and answered their questions. The patient or their representative has agreed to have anesthesia services.    _____________________________________________________________________________  Witness        Date   Time  I have verified that the signature is that of the patient or patient’s representative, and that it was signed before the procedure  Patient Name: Xena Gallardo     : 1990                 Printed: 3/15/2024 at 9:44 AM    Medical Record #: [id number]                                            Page 1 of 1  ----------ANESTHESIA CONSENT----------